# Patient Record
Sex: FEMALE | Race: WHITE | NOT HISPANIC OR LATINO | Employment: UNEMPLOYED | URBAN - METROPOLITAN AREA
[De-identification: names, ages, dates, MRNs, and addresses within clinical notes are randomized per-mention and may not be internally consistent; named-entity substitution may affect disease eponyms.]

---

## 2017-03-17 ENCOUNTER — APPOINTMENT (OUTPATIENT)
Dept: RADIOLOGY | Facility: IMAGING CENTER | Age: 27
End: 2017-03-17
Attending: NURSE PRACTITIONER
Payer: COMMERCIAL

## 2017-03-17 ENCOUNTER — OCCUPATIONAL MEDICINE (OUTPATIENT)
Dept: URGENT CARE | Facility: CLINIC | Age: 27
End: 2017-03-17
Payer: COMMERCIAL

## 2017-03-17 VITALS
RESPIRATION RATE: 16 BRPM | OXYGEN SATURATION: 98 % | TEMPERATURE: 98.2 F | HEIGHT: 65 IN | BODY MASS INDEX: 21.66 KG/M2 | DIASTOLIC BLOOD PRESSURE: 64 MMHG | WEIGHT: 130 LBS | SYSTOLIC BLOOD PRESSURE: 100 MMHG | HEART RATE: 72 BPM

## 2017-03-17 DIAGNOSIS — M77.8 TENDINITIS OF LEFT WRIST: ICD-10-CM

## 2017-03-17 DIAGNOSIS — S69.92XA INJURY OF LEFT WRIST, INITIAL ENCOUNTER: ICD-10-CM

## 2017-03-17 PROCEDURE — 99204 OFFICE O/P NEW MOD 45 MIN: CPT | Performed by: NURSE PRACTITIONER

## 2017-03-17 PROCEDURE — 73110 X-RAY EXAM OF WRIST: CPT | Mod: 26,LT | Performed by: NURSE PRACTITIONER

## 2017-03-17 ASSESSMENT — ENCOUNTER SYMPTOMS
BACK PAIN: 0
NECK PAIN: 0
FALLS: 1

## 2017-03-17 NOTE — MR AVS SNAPSHOT
"        Mita Jenkins   3/17/2017 11:30 AM   Occupational Medicine   MRN: [id number]    Department:  [facility]   Dept Phone:  [phone]    Description:  Female : 1990   Provider:  JONO Alexander           Reason for Visit     Wrist Injury left wrist injury x 2 couple weeks wrist pops and pain radiates      Allergies as of 3/17/2017     Allergen Noted Reactions    Codeine 2016   Vomiting      You were diagnosed with     Injury of left wrist, initial encounter   [757795]       Tendinitis of left wrist   [509763]         Vital Signs     Blood Pressure Pulse Temperature Respirations Height Weight    100/64 mmHg 72 36.8 °C (98.2 °F) 16 1.651 m (5' 5\") 58.968 kg (130 lb)    Body Mass Index Oxygen Saturation Last Menstrual Period Breastfeeding?          21.63 kg/m2 98% 2017 No        Basic Information     Date Of Birth Sex Race Ethnicity Preferred Language    1990 Female White Non- English      Your appointments     Mar 24, 2017  8:00 AM   Workers Compensation with [facility] ([facility])    [address] [place] [address] [address]  [place] [id number] [phone]              Problem List              ICD-10-CM Priority Class Noted - Resolved    Syncope and collapse R55   2016 - Present      Health Maintenance        Date Due Completion Dates    IMM DTaP/Tdap/Td Vaccine (1 - Tdap) 2007 ---    PAP SMEAR 2009 ---    IMM INFLUENZA (1) 2016 ---            Current Immunizations     No immunizations on file.      Below and/or attached are the medications your provider expects you to take. Review all of your home medications and newly ordered medications with your provider and/or pharmacist. Follow medication instructions as directed by your provider and/or pharmacist. Please keep your medication list with you and share with your provider. Update the information when medications are discontinued, doses are changed, or " new medications (including over-the-counter products) are added; and carry medication information at all times in the event of emergency situations     Allergies:  CODEINE - Vomiting               Medications  Valid as of: March 17, 2017 -  1:04 PM    Generic Name Brand Name Tablet Size Instructions for use    Cyclobenzaprine HCl (Tab) FLEXERIL 10 MG Take 1 Tab by mouth 3 times a day as needed for Muscle Spasms.        Ibuprofen (Tab) MOTRIN 600 MG Take 1 Tab by mouth every 6 hours as needed.        MethylPREDNISolone (Kit) MEDROL DOSEPACK 4 MG On day one take 8 mg PO before breakfast and HS, and 4 mg PO after lunch and after dinner. On day two take 4 mg PO 3 times a day and 8 mg PO at bedtime. On day 3 take 4 mg PO 4 times a day. On day 4 take 4 mg PO 3 times a day. On day 5 take 4 mg PO twice a day.On day 6 take 4 mg PO daily        .                 Medicines prescribed today were sent to:     [facility] [address] [place] [address] [place] AT [facility] [employer] [place] [address] [place] [address]    Phone: [phone] Fax: [phone]    Open 24 Hours?: No      Medication refill instructions:       If your prescription bottle indicates you have medication refills left, it is not necessary to call your provider’s office. Please contact your pharmacy and they will refill your medication.    If your prescription bottle indicates you do not have any refills left, you may request refills at any time through one of the following ways: The online [employer] system (except Urgent Care), by calling your provider’s office, or by asking your pharmacy to contact your provider’s office with a refill request. Medication refills are processed only during regular business hours and may not be available until the next business day. Your provider may request additional information or to have a follow-up visit with you prior to refilling your medication.   *Please Note: Medication refills are assigned a new Rx  number when refilled electronically. Your pharmacy may indicate that no refills were authorized even though a new prescription for the same medication is available at the pharmacy. Please request the medicine by name with the pharmacy before contacting your provider for a refill.        Your To Do List     Future Labs/Procedures Complete By Expires    DX-WRIST-COMPLETE 3+ LEFT  As directed 3/17/2018         [employer] Access Code: [email or web address]  Expires: 3/23/2017  1:11 PM    [employer]  A secure, online tool to manage your health information     [employer]’s [employer]® is a secure, online tool that connects you to your personalized health information from the privacy of your home -- day or night - making it very easy for you to manage your healthcare. Once the activation process is completed, you can even access your medical information using the [employer] vera, which is available for free in the Apple Vera store or Google Play store.     [employer] provides the following levels of access (as shown below):   My Chart Features   [facility] Doctor [facility]  Specialists [facility]  Urgent  Care [facility]  Primary Care  Doctor   Email your healthcare team securely and privately 24/7 X X X    Manage appointments: schedule your next appointment; view details of past/upcoming appointments X      Request prescription refills. X      View recent personal medical records, including lab and immunizations X X X X   View health record, including health history, allergies, medications X X X X   Read reports about your outpatient visits, procedures, consult and ER notes X X X X   See your discharge summary, which is a recap of your hospital and/or ER visit that includes your diagnosis, lab results, and care plan. X X       How to register for [employer]:  1. Go to  [email or web address].  2. Click on the Sign Up Now box, which takes you to the New Member Sign Up page. You will need to provide the following information:  a. Enter  your [employer] Access Code exactly as it appears at the top of this page. (You will not need to use this code after you’ve completed the sign-up process. If you do not sign up before the expiration date, you must request a new code.)   b. Enter your date of birth.   c. Enter your home email address.   d. Click Submit, and follow the next screen’s instructions.  3. Create a M[employer] Cortat ID. This will be your [employer] login ID and cannot be changed, so think of one that is secure and easy to remember.  4. Create a [employer] password. You can change your password at any time.  5. Enter your Password Reset Question and Answer. This can be used at a later time if you forget your password.   6. Enter your e-mail address. This allows you to receive e-mail notifications when new information is available in [employer].  7. Click Sign Up. You can now view your health information.    For assistance activating your [employer] account, call [phone]

## 2017-03-17 NOTE — Clinical Note
[facility]   [address] - [place] [address]  Phone: [phone] - Fax: [phone]        [facility] Progress Report and Disability Certification  Date of Service: 3/17/2017   No Show:  No  Date / Time of Next Visit: 3/24/2017   Claim Information   Patient Name: Mita Jenkins  Claim Number:     Employer:   [employer] intermediate Date of Injury: 2/27/2017     Insurer / TPA: [employer]  ID / SSN:     Occupation:   Diagnosis: Diagnoses of Injury of left wrist, initial encounter and Tendinitis of left wrist were pertinent to this visit.    Medical Information   Related to Industrial Injury? Yes    Subjective Complaints:  DOI: 2/27/17- Slipped on ice while walking outside at work, broke her fall with her left hand. She reports immediate pain and moderate swelling after the injury and did seek medical attention from nurse on-site at [facility]. Two weeks after the initial injury, she is still having significant pain, decreased ROM, decreased strength and it is difficult to perform her daily work duties. Ibuprofen and ice are helping minimally.   Objective Findings: Musculoskeletal:        Left wrist: She exhibits decreased range of motion, tenderness, bony tenderness and swelling. She exhibits no effusion, no crepitus and no deformity. TTP, mild BERNICE, no ecchymosis, pain with rotation and extension, decreased  strength. CMS intact. Brisk cap refill.   Pre-Existing Condition(s):     Assessment:   Initial Visit    Status: Additional Care Required  Permanent Disability:No    Plan:      Diagnostics: X-ray  Comments:Unremarkable LEFT wrist.    Comments:  Left wrist thumb spica   Ibuprofen 600mg q6h PRN pain  RICE  Follow up in one week    Disability Information   Status: Released to Restricted Duty    From:  3/17/2017  Through: 3/24/2017 Restrictions are: Temporary   Physical Restrictions   Sitting:     Standing:    Stooping:    Bending:      Squatting:    Walking:    Climbing:    Pushing:      Pulling:    Other:    Reaching Above Shoulder (L):   Reaching Above Shoulder (R):       Reaching Below Shoulder (L):    Reaching Below Shoulder (R):      Not to exceed Weight Limits   Carrying(hrs):   Weight Limit(lb): < or = to 25 pounds Lifting(hrs):   Weight  Limit(lb):     Comments: No physical defense/contact with inmates      Repetitive Actions   Hands: i.e. Fine Manipulations from Grasping:     Feet: i.e. Operating Foot Controls:     Driving / Operate Machinery:     Physician Name: JONO Alexander Physician Signature: JOSEFINA Martínez e-Signature: Dr. Chan Hampton, Medical Director   Clinic Name / Location: [address] [place] [address] [place] [address] Clinic Phone Number: Dept: [phone]   Appointment Time: 11:30 Am Visit Start Time: 11:51 AM   Check-In Time:  11:28 Am Visit Discharge Time:  12:54 PM   Original-Treating Physician or Chiropractor    Page 2-Insurer/TPA    Page 3-Employer    Page 4-Employee

## 2017-03-17 NOTE — PROGRESS NOTES
"Subjective:      Mita Jenkins is a 26 y.o. female who presents with Wrist Injury      DOI: 2/27/17- Slipped on ice while walking outside at work, broke her fall with her left hand. She reports immediate pain and moderate swelling after the injury but did not think her wrist required medical attention. Two weeks after the initial injury, she is still having significant pain, decreased ROM, decreased strength and it is difficult to perform her daily work duties. Ibuprofen and ice are helping minimally.     Wrist Injury         Review of Systems   Musculoskeletal: Positive for joint pain and falls. Negative for back pain and neck pain.   All other systems reviewed and are negative.    PMH:  has no past medical history on file.  MEDS:   Current outpatient prescriptions:   •  ibuprofen (MOTRIN) 600 MG Tab, Take 1 Tab by mouth every 6 hours as needed., Disp: 30 Tab, Rfl: 0  •  methylPREDNISolone (MEDROL, BREANN,) 4 MG tablet, On day one take 8 mg PO before breakfast and HS, and 4 mg PO after lunch and after dinner. On day two take 4 mg PO 3 times a day and 8 mg PO at bedtime. On day 3 take 4 mg PO 4 times a day. On day 4 take 4 mg PO 3 times a day. On day 5 take 4 mg PO twice a day.On day 6 take 4 mg PO daily, Disp: 1 Kit, Rfl: 0  •  cyclobenzaprine (FLEXERIL) 10 MG Tab, Take 1 Tab by mouth 3 times a day as needed for Muscle Spasms., Disp: 21 Tab, Rfl: 0  ALLERGIES:   Allergies   Allergen Reactions   • Codeine Vomiting     SURGHX: No past surgical history on file.  SOCHX:    FH: In accordance with JEM Act of 2008, family history is not collected for Occupational Health visits.         Objective:     /64 mmHg  Pulse 72  Temp(Src) 36.8 °C (98.2 °F)  Resp 16  Ht 1.651 m (5' 5\")  Wt 58.968 kg (130 lb)  BMI 21.63 kg/m2  SpO2 98%  LMP 03/01/2017  Breastfeeding? No     Physical Exam   Constitutional: She is oriented to person, place, and time. Vital signs are normal. She appears well-developed and well-nourished. "   HENT:   Head: Normocephalic.   Eyes: EOM are normal. Pupils are equal, round, and reactive to light.   Neck: Normal range of motion.   Cardiovascular: Normal rate and regular rhythm.    Pulmonary/Chest: Effort normal.   Musculoskeletal:        Left wrist: She exhibits decreased range of motion, tenderness, bony tenderness and swelling. She exhibits no effusion, no crepitus and no deformity.        Arms:  Neurological: She is alert and oriented to person, place, and time. No cranial nerve deficit or sensory deficit.   Skin: Skin is warm and dry.   Psychiatric: She has a normal mood and affect. Her speech is normal and behavior is normal. Thought content normal.   Vitals reviewed.           Xray: no fracture or dislocation by my read. Radiology review pending.     3/17/2017 12:06 PM    HISTORY/REASON FOR EXAM:  Pain/Deformity Following Trauma.  Fall 2 weeks ago, LEFT wrist pain    TECHNIQUE/EXAM DESCRIPTION AND NUMBER OF VIEWS:  4 views of the LEFT wrist.    COMPARISON: None    FINDINGS:  No focal soft tissue swelling.  Carpal alignment is preserved.  No fracture or dislocation.         Impression        Unremarkable LEFT wrist.       Assessment/Plan:     1. Injury of left wrist, initial encounter  - DX-WRIST-COMPLETE 3+ LEFT; Future    Left wrist thumb spica    Ibuprofen 600mg q6h PRN pain  RICE  Follow up in one week

## 2017-03-17 NOTE — Clinical Note
"EMPLOYEE’S CLAIM FOR COMPENSATION/ REPORT OF INITIAL TREATMENT  FORM C-4    EMPLOYEE’S CLAIM - PROVIDE ALL INFORMATION REQUESTED   First Name  Mita Last Name  Gregory Birthdate                    1990                Sex  female Claim Number   Home Address  [address] Age  26 y.o. Height  1.651 m (5' 5\") Weight  58.968 kg (130 lb) [facility]     [facility] Zip  [address] Telephone  [phone] (home)    Mailing Address  [address] [facility] Zip  [address] Primary Language Spoken  English    Insurer  State Compensation Ins Fund Third Party   State Compensation Ins Fund   Employee's Occupation (Job Title) When Injury or Occupational Disease Occurred      Employer's Name    [employer] Telephone   [phone]   Employer Address   [phone] [facility] Zip   [address]   Date of Injury  2/27/2017               Hour of Injury  2:30 AM Date Employer Notified  2/27/2017 Last Day of Work after Injury or Occupational Disease  3/16/2017 Supervisor to Whom Injury Reported  S[employer] Estrada   Address or Location of Accident (if applicable)  [[address]]   What were you doing at the time of accident? (if applicable)  Coming out of [address]    How did this injury or occupational disease occur? (Be specific an answer in detail. Use additional sheet if necessary)  Slipped on ice and landed on wrist   If you believe that you have an occupational disease, when did you first have knowledge of the disability and it relationship to your employment?  N/A Witnesses to the Accident  None      Nature of Injury or Occupational Disease  Sprain  Part(s) of Body Injured or Affected  Wrist (L) and Hand (L), N/A, N/A    I certify that the above is true and correct to the best of my knowledge and that I have provided this " information in order to obtain the benefits of [place]  I hereby authorize any physician, chiropractor, surgeon, practitioner, or other person, any hospital, including [facility], any medical service organization, any insurance company, or other institution or organization to release to each other, any medical or other information, including benefits paid or payable, pertinent to this injury or disease, except information relative to diagnosis, treatment and/or counseling for AIDS, psychological conditions, alcohol or controlled substances, for which I must give specific authorization.  A Photostat of this authorization shall be as valid as the original.     Date   Place   Employee’s Signature   THIS REPORT MUST BE COMPLETED AND MAILED WITHIN 3 WORKING DAYS OF TREATMENT   Place  [facility]  Name of Facility  [facility]   Date  3/17/2017 Diagnosis  (S69.92XA) Injury of left wrist, initial encounter  (M77.8) Tendinitis of left wrist Is there evidence the injured employee was under the influence of alcohol and/or another controlled substance at the time of accident?   Hour  11:51 AM Description of Injury or Disease  Diagnoses of Injury of left wrist, initial encounter and Tendinitis of left wrist were pertinent to this visit. No   Treatment  Left wrist thumb spica  Ibuprofen 600mg q6h PRN pain  RICE  Follow up in one week  Have you advised the patient to remain off work five days or more? No   X-Ray Findings  Negative   If Yes   From Date  To Date      From information given by the employee, together with medical evidence, can you directly connect this injury or occupational disease as job incurred?  Yes If No Full Duty  No Modified Duty  Yes   Is additional medical care by a physician indicated?  Yes If Modified Duty, Specify any Limitations / Restrictions  No  "physical contact with inmates   Do you know of any previous injury or disease contributing to this condition or occupational disease?                            No   Date  3/17/2017 Print Doctor’s Name JONO Alexander I certify the employer’s copy of  this form was mailed on:   Address  [address] Insurer’s Use Only     [facility] Zip  [address]    Provider’s Tax ID Number  [id number]  Telephone  Dept: [phone]        e-SignWHJOSEFINA STOCK   e-Signature: Dr. Chan Hampton, Medical Director Degree  APRN        ORIGINAL-TREATING PHYSICIAN OR CHIROPRACTOR    PAGE 2-INSURER/TPA    PAGE 3-EMPLOYER    PAGE 4-EMPLOYEE             Form C-4 (rev10/07)              BRIEF DESCRIPTION OF RIGHTS AND BENEFITS  (Pursuant to [place]RS 616C.050)    Notice of Injury or Occupational Disease (Incident Report Form C-1): If an injury or occupational disease (OD) arises out of and in the  course of employment, you must provide written notice to your employer as soon as practicable, but no later than 7 days after the accident or  OD. Your employer shall maintain a sufficient supply of the required forms.    Claim for Compensation (Form C-4): If medical treatment is sought, the form C-4 is available at the place of initial treatment. A completed  \"Claim for Compensation\" (Form C-4) must be filed within 90 days after an accident or OD. The treating physician or chiropractor must,  within 3 working days after treatment, complete and mail to the employer, the employer's insurer and third-party , the Claim for  Compensation.    Medical Treatment: If you require medical treatment for your on-the-job injury or OD, you may be required to select a physician or  chiropractor from a list provided by your workers’ compensation insurer, if it has contracted with an Organization for Managed Care (MCO) or  Preferred Provider Organization (PPO) or providers of health care. If your " employer has not entered into a contract with an MCO or PPO, you  may select a physician or chiropractor from the Panel of Physicians and Chiropractors. Any medical costs related to your industrial injury or  OD will be paid by your insurer.    Temporary Total Disability (TTD): If your doctor has certified that you are unable to work for a period of at least 5 consecutive days, or 5  cumulative days in a 20-day period, or places restrictions on you that your employer does not accommodate, you may be entitled to TTD  compensation.    Temporary Partial Disability (TPD): If the wage you receive upon reemployment is less than the compensation for TTD to which you are  entitled, the insurer may be required to pay you TPD compensation to make up the difference. TPD can only be paid for a maximum of 24  months.    Permanent Partial Disability (PPD): When your medical condition is stable and there is an indication of a PPD as a result of your injury or  OD, within 30 days, your insurer must arrange for an evaluation by a rating physician or chiropractor to determine the degree of your PPD. The  amount of your PPD award depends on the date of injury, the results of the PPD evaluation and your age and wage.    Permanent Total Disability (PTD): If you are medically certified by a treating physician or chiropractor as permanently and totally disabled  and have been granted a PTD status by your insurer, you are entitled to receive monthly benefits not to exceed 66 2/3% of your average  monthly wage. The amount of your PTD payments is subject to reduction if you previously received a PPD award.    Vocational Rehabilitation Services: You may be eligible for vocational rehabilitation services if you are unable to return to the job due to a  permanent physical impairment or permanent restrictions as a result of your injury or occupational disease.    Transportation and Per [place] Reimbursement: You may be eligible for travel expenses  and per spring associated with medical treatment.    Reopening: You may be able to reopen your claim if your condition worsens after claim closure.    Appeal Process: If you disagree with a written determination issued by the insurer or the insurer does not respond to your request, you may  appeal to the Department of Administration, , by following the instructions contained in your determination letter. You must  appeal the determination within 70 days from the date of the determination letter at [address], [address], [place], [place]  [address], or [address], [place], [place] [address]. If you disagree with the  decision, you may appeal to the  Department of Administration, . You must file your appeal within 30 days from the date of the  decision  letter at [address], [address], [place], [place] [address], or [address], [place], [place] [address]. If you  disagree with a decision of an , you may file a petition for judicial review with the District Court. You must do so within 30  days of the Appeal Officer’s decision. You may be represented by an  at your own expense or you may contact the [facility] for possible  representation.    [place]  for Injured Workers (NAIW): If you disagree with a  decision, you may request that NAIW represent you  without charge at an  Hearing. For information regarding denial of benefits, you may contact the [facility] at: [address], [place] [address], [phone], or [address], [place] [address], [phone]    To File a Complaint with the Division: If you wish to file a complaint with the  of the Division of Industrial Relations (DIR),  please contact the Workers’ Compensation Section, [address], [address], [place], [place] [address],  telephone [phone], or  [address], [address], [place], [place] [address], telephone [phone].    For assistance with Workers’ Compensation Issues: you may contact the Office of the Governor Consumer Health Assistance, [address] [place] [address], [address], [place], [place] [address], Toll Free 1-638.346.1642, Web site: [email or web address]., E-mail  [email or web address].                                                                                                                                                                                                                                   __________________________________________________________________                                                                   _________________                Employee Name / Signature                                                                                                                                                       Date                                                                                                                                                                                                     D-2 (rev. 10/07)

## 2017-03-24 ENCOUNTER — OFFICE VISIT (OUTPATIENT)
Dept: URGENT CARE | Facility: CLINIC | Age: 27
End: 2017-03-24
Payer: COMMERCIAL

## 2017-03-24 ENCOUNTER — OCCUPATIONAL MEDICINE (OUTPATIENT)
Dept: URGENT CARE | Facility: CLINIC | Age: 27
End: 2017-03-24
Payer: COMMERCIAL

## 2017-03-24 ENCOUNTER — HOSPITAL ENCOUNTER (OUTPATIENT)
Facility: MEDICAL CENTER | Age: 27
End: 2017-03-24
Attending: NURSE PRACTITIONER
Payer: COMMERCIAL

## 2017-03-24 VITALS
BODY MASS INDEX: 21.66 KG/M2 | TEMPERATURE: 98.4 F | SYSTOLIC BLOOD PRESSURE: 110 MMHG | RESPIRATION RATE: 16 BRPM | DIASTOLIC BLOOD PRESSURE: 62 MMHG | OXYGEN SATURATION: 98 % | HEART RATE: 72 BPM | HEIGHT: 65 IN | WEIGHT: 130 LBS | RESPIRATION RATE: 16 BRPM | HEART RATE: 72 BPM | SYSTOLIC BLOOD PRESSURE: 110 MMHG | TEMPERATURE: 98.4 F | DIASTOLIC BLOOD PRESSURE: 62 MMHG | OXYGEN SATURATION: 98 %

## 2017-03-24 DIAGNOSIS — N89.8 ITCHING IN THE VAGINAL AREA: ICD-10-CM

## 2017-03-24 DIAGNOSIS — M77.8 LEFT WRIST TENDINITIS: ICD-10-CM

## 2017-03-24 PROCEDURE — 99213 OFFICE O/P EST LOW 20 MIN: CPT | Performed by: NURSE PRACTITIONER

## 2017-03-24 PROCEDURE — 99213 OFFICE O/P EST LOW 20 MIN: CPT | Mod: 29 | Performed by: NURSE PRACTITIONER

## 2017-03-24 PROCEDURE — 87591 N.GONORRHOEAE DNA AMP PROB: CPT

## 2017-03-24 PROCEDURE — 87491 CHLMYD TRACH DNA AMP PROBE: CPT

## 2017-03-24 RX ORDER — IBUPROFEN 200 MG
200 TABLET ORAL EVERY 6 HOURS PRN
COMMUNITY
End: 2019-01-18

## 2017-03-24 RX ORDER — CITALOPRAM 20 MG/1
20 TABLET ORAL DAILY
COMMUNITY
End: 2019-01-04

## 2017-03-24 RX ORDER — NORGESTIMATE AND ETHINYL ESTRADIOL 7DAYSX3 LO
KIT ORAL
COMMUNITY
End: 2019-01-04

## 2017-03-24 RX ORDER — FLUCONAZOLE 150 MG/1
150 TABLET ORAL DAILY
Qty: 1 TAB | Refills: 1 | Status: SHIPPED | OUTPATIENT
Start: 2017-03-24 | End: 2019-01-04

## 2017-03-24 ASSESSMENT — ENCOUNTER SYMPTOMS
HEADACHES: 0
DIARRHEA: 0
NAUSEA: 0
COUGH: 0
MYALGIAS: 0
PALPITATIONS: 0
CHILLS: 0
CHILLS: 0
NAUSEA: 0
SHORTNESS OF BREATH: 0
FEVER: 0
PALPITATIONS: 0
FEVER: 0
HEADACHES: 0
DIZZINESS: 0
DIZZINESS: 0
VOMITING: 0
VOMITING: 0
SHORTNESS OF BREATH: 0
DIARRHEA: 0
SORE THROAT: 0
MYALGIAS: 0

## 2017-03-24 NOTE — PROGRESS NOTES
"Subjective:      Mita Jenkins is a 26 y.o. female who presents with Exposure to STD    Chief Complaint   Patient presents with   • Exposure to STD     pos std , burning and itching x 4 days     Vaginal itching and burning  Same sexual partner for one year  Was on antibiotics recently for dental infection  No pain on urination  Getting worse  Never had yeast infection before          Exposure to STD  Pertinent negatives include no chest pain, chills, fever, headaches, myalgias, nausea, rash or vomiting.       Review of Systems   Constitutional: Negative for fever, chills and malaise/fatigue.   Respiratory: Negative for shortness of breath.    Cardiovascular: Negative for chest pain and palpitations.   Gastrointestinal: Negative for nausea, vomiting and diarrhea.   Genitourinary: Negative for dysuria.   Musculoskeletal: Negative for myalgias.   Skin: Negative for rash.   Neurological: Negative for dizziness and headaches.   No past medical history on file.  Family history reviewed and not related to this visit  Social History     Social History   • Marital Status: Single     Spouse Name: N/A   • Number of Children: N/A   • Years of Education: N/A     Occupational History   • Not on file.     Social History Main Topics   • Smoking status: Not on file   • Smokeless tobacco: Not on file   • Alcohol Use: Not on file   • Drug Use: Not on file   • Sexual Activity: Not on file     Other Topics Concern   • Not on file     Social History Narrative            Objective:     /62 mmHg  Pulse 72  Temp(Src) 36.9 °C (98.4 °F)  Resp 16  Ht 1.651 m (5' 5\")  Wt 58.968 kg (130 lb)  BMI 21.63 kg/m2  SpO2 98%  LMP 03/01/2017  Breastfeeding? No     Physical Exam   Constitutional: She is oriented to person, place, and time. She appears well-developed and well-nourished. No distress.   HENT:   Head: Normocephalic and atraumatic.   Cardiovascular: Normal rate and regular rhythm.    Genitourinary: Vaginal discharge (thick " patchy, no odor consitent with yeast ) found.   Lymphadenopathy:        Right: No inguinal adenopathy present.        Left: No inguinal adenopathy present.   Neurological: She is alert and oriented to person, place, and time.   Skin: Skin is warm and dry.   Psychiatric: She has a normal mood and affect. Her behavior is normal. Judgment and thought content normal.   Nursing note and vitals reviewed.              Assessment/Plan:     1. Itching in the vaginal area     - fluconazole (DIFLUCAN) 150 MG tablet; Take 1 Tab by mouth every day.  Dispense: 1 Tab; Refill: 1  - CHLAMYDIA/GC PCR URINE OR SWAB; Future    .Ok to give pt results    Please make an appointment for follow up with your primary care provider or make appointment to establish with a primary care. Return for any perception of change in condition, worsening of symptoms, such as perception of worse pain, worsening fever, not getting better, or any other concerns. Please go to the nearest emergency room for any shortness of breath or chest pain or for any of the emergent conditions we have discussed. Patient and/or family states understanding to plan of care, and discharge instructions. Different diagnosis were discussed and signs/symptoms were discussed to educate on.

## 2017-03-24 NOTE — MR AVS SNAPSHOT
Mita LEWumsteg   3/24/2017 8:00 AM   Occupational Medicine   MRN: [id number]    Department:  [facility]   Dept Phone:  [phone]    Description:  Female : 1990   Provider:  CADEN Katz.           Reason for Visit     Follow-Up wc left wrist feeling better      Allergies as of 3/24/2017     Allergen Noted Reactions    Codeine 2016   Vomiting      You were diagnosed with     Left wrist tendinitis   [[id number]]         Vital Signs     Blood Pressure Pulse Temperature Respirations Oxygen Saturation Last Menstrual Period    110/62 mmHg 72 36.9 °C (98.4 °F) 16 98% 2017    Breastfeeding?                   No           Basic Information     Date Of Birth Sex Race Ethnicity Preferred Language    1990 Female White Non- English      Your appointments     Mar 31, 2017  8:00 AM   Workers Compensation with [facility] URGENT [facility]gent [facility] ([facility])    [address]  [place] [id number] [phone]              Problem List              ICD-10-CM Priority Class Noted - Resolved    Syncope and collapse R55   2016 - Present      Health Maintenance        Date Due Completion Dates    IMM DTaP/Tdap/Td Vaccine (1 - Tdap) 2007 ---    PAP SMEAR 2009 ---    IMM INFLUENZA (1) 2016 ---            Current Immunizations     No immunizations on file.      Below and/or attached are the medications your provider expects you to take. Review all of your home medications and newly ordered medications with your provider and/or pharmacist. Follow medication instructions as directed by your provider and/or pharmacist. Please keep your medication list with you and share with your provider. Update the information when medications are discontinued, doses are changed, or new medications (including over-the-counter products) are added; and carry medication information at all times in the event of emergency situations     Allergies:   CODEINE - Vomiting               Medications  Valid as of: March 24, 2017 -  9:22 AM    Generic Name Brand Name Tablet Size Instructions for use    Citalopram Hydrobromide (Tab) CELEXA 20 MG Take 20 mg by mouth every day.        Cyclobenzaprine HCl (Tab) FLEXERIL 10 MG Take 1 Tab by mouth 3 times a day as needed for Muscle Spasms.        Fluconazole (Tab) DIFLUCAN 150 MG Take 1 Tab by mouth every day.        Ibuprofen (Tab) MOTRIN 600 MG Take 1 Tab by mouth every 6 hours as needed.        Ibuprofen (Tab) MOTRIN 200 MG Take 200 mg by mouth every 6 hours as needed.        MethylPREDNISolone (Kit) MEDROL DOSEPACK 4 MG On day one take 8 mg PO before breakfast and HS, and 4 mg PO after lunch and after dinner. On day two take 4 mg PO 3 times a day and 8 mg PO at bedtime. On day 3 take 4 mg PO 4 times a day. On day 4 take 4 mg PO 3 times a day. On day 5 take 4 mg PO twice a day.On day 6 take 4 mg PO daily        Norgestim-Eth Estrad Triphasic (Tab) Norgestim-Eth Estrad Triphasic 0.18/0.215/0.25 MG-25 MCG Take  by mouth.        .                 Medicines prescribed today [facility] to:     [facility] [address] [place] [address] [address] AT [facility] [address] [address] [place] [address]    Phone: [phone] Fax: [phone]    Open 24 Hours?: No      Medication refill instructions:       If your prescription bottle indicates you have medication refills left, it is not necessary to call your provider’s office. Please contact your pharmacy and they will refill your medication.    If your prescription bottle indicates you do not have any refills left, you may request refills at any time through one of the following ways: The online Socialtyze system (except Urgent Care), by calling your provider’s office, or by asking your pharmacy to contact your provider’s office with a refill request. Medication refills are processed only during regular business hours and may not be available until the next  business day. Your provider may request additional information or to have a follow-up visit with you prior to refilling your medication.   *Please Note: Medication refills are assigned a new Rx number when refilled electronically. Your pharmacy may indicate that no refills were authorized even though a new prescription for the same medication is available at the pharmacy. Please request the medicine by name with the pharmacy before contacting your provider for a refill.           nexTune Access Code: [email or web address]  Expires: 4/23/2017  9:22 AM    nexTune  A secure, online tool to manage your health information     [employer]’s nexTune® is a secure, online tool that connects you to your personalized health information from the privacy of your home -- day or night - making it very easy for you to manage your healthcare. Once the activation process is completed, you can even access your medical information using the nexTune vera, which is available for free in the Apple Vera store or Google Play store.     nexTune provides the following levels of access (as shown below):   My Chart Features   [facility] Doctor [facility]  Specialists [facility]  Urgent  Care Non-[facility]  Primary Care  Doctor   Email your healthcare team securely and privately 24/7 X X X    Manage appointments: schedule your next appointment; view details of past/upcoming appointments X      Request prescription refills. X      View recent personal medical records, including lab and immunizations X X X X   View health record, including health history, allergies, medications X X X X   Read reports about your outpatient visits, procedures, consult and ER notes X X X X   See your discharge summary, which is a recap of your hospital and/or ER visit that includes your diagnosis, lab results, and care plan. X X       How to register for nexTune:  1. Go to  [email or web address].  2. Click on the Sign Up Now box, which takes you to the New Member Sign  Up page. You will need to provide the following information:  a. Enter your N[employer] Access Code exactly as it appears at the top of this page. (You will not need to use this code after you’ve completed the sign-up process. If you do not sign up before the expiration date, you must request a new code.)   b. Enter your date of birth.   c. Enter your home email address.   d. Click Submit, and follow the next screen’s instructions.  3. Create a Nvigen ID. This will be your Nvigen login ID and cannot be changed, so think of one that is secure and easy to remember.  4. Create a Nvigen password. You can change your password at any time.  5. Enter your Password Reset Question and Answer. This can be used at a later time if you forget your password.   6. Enter your e-mail address. This allows you to receive e-mail notifications when new information is available in Nvigen.  7. Click Sign Up. You can now view your health information.    For assistance activating your Nvigen account, call [phone]

## 2017-03-24 NOTE — MR AVS SNAPSHOT
"        Mita Donovanron   3/24/2017 8:30 AM   Office Visit   MRN: [id number]    Department:  [facility]   Dept Phone:  [phone]    Description:  Female : 1990   Provider:  CADEN Katz.           Reason for Visit     Exposure to STD pos std , burning and itching x 4 days      Allergies as of 3/24/2017     Allergen Noted Reactions    Codeine 2016   Vomiting      You were diagnosed with     Itching in the vaginal area   [147640]         Vital Signs     Blood Pressure Pulse Temperature Respirations Height Weight    110/62 mmHg 72 36.9 °C (98.4 °F) 16 1.651 m (5' 5\") 58.968 kg (130 lb)    Body Mass Index Oxygen Saturation Last Menstrual Period Breastfeeding?          21.63 kg/m2 98% 2017 No        Basic Information     Date Of Birth Sex Race Ethnicity Preferred Language    1990 Female White Non- English      Your appointments     Mar 31, 2017  8:00 AM   Workers Compensation with [facility]    [address] [place] [address] [address]  [place] [address]   [phone]              Problem List              ICD-10-CM Priority Class Noted - Resolved    Syncope and collapse R55   2016 - Present      Health Maintenance        Date Due Completion Dates    IMM DTaP/Tdap/Td Vaccine (1 - Tdap) 2007 ---    PAP SMEAR 2009 ---    IMM INFLUENZA (1) 2016 ---            Current Immunizations     No immunizations on file.      Below and/or attached are the medications your provider expects you to take. Review all of your home medications and newly ordered medications with your provider and/or pharmacist. Follow medication instructions as directed by your provider and/or pharmacist. Please keep your medication list with you and share with your provider. Update the information when medications are discontinued, doses are changed, or new medications (including over-the-counter products) are added; and carry medication " information at all times in the event of emergency situations     Allergies:  CODEINE - Vomiting               Medications  Valid as of: March 24, 2017 -  9:24 AM    Generic Name Brand Name Tablet Size Instructions for use    Citalopram Hydrobromide (Tab) CELEXA 20 MG Take 20 mg by mouth every day.        Cyclobenzaprine HCl (Tab) FLEXERIL 10 MG Take 1 Tab by mouth 3 times a day as needed for Muscle Spasms.        Fluconazole (Tab) DIFLUCAN 150 MG Take 1 Tab by mouth every day.        Ibuprofen (Tab) MOTRIN 600 MG Take 1 Tab by mouth every 6 hours as needed.        Ibuprofen (Tab) MOTRIN 200 MG Take 200 mg by mouth every 6 hours as needed.        MethylPREDNISolone (Kit) MEDROL DOSEPACK 4 MG On day one take 8 mg PO before breakfast and HS, and 4 mg PO after lunch and after dinner. On day two take 4 mg PO 3 times a day and 8 mg PO at bedtime. On day 3 take 4 mg PO 4 times a day. On day 4 take 4 mg PO 3 times a day. On day 5 take 4 mg PO twice a day.On day 6 take 4 mg PO daily        Norgestim-Eth Estrad Triphasic (Tab) Norgestim-Eth Estrad Triphasic 0.18/0.215/0.25 MG-25 MCG Take  by mouth.        .                 Medicines prescribed today were sent to:     [facility] [address] [place], [place] [address] - [address] AT [place] [address] [place] [address]    Phone: [phone] Fax: [phone]    Open 24 Hours?: No      Medication refill instructions:       If your prescription bottle indicates you have medication refills left, it is not necessary to call your provider’s office. Please contact your pharmacy and they will refill your medication.    If your prescription bottle indicates you do not have any refills left, you may request refills at any time through one of the following ways: The online coUrbanize system (except Urgent Care), by calling your provider’s office, or by asking your pharmacy to contact your provider’s office with a refill request. Medication refills are processed only  during regular business hours and may not be available until the next business day. Your provider may request additional information or to have a follow-up visit with you prior to refilling your medication.   *Please Note: Medication refills are assigned a new Rx number when refilled electronically. Your pharmacy may indicate that no refills were authorized even though a new prescription for the same medication is available at the pharmacy. Please request the medicine by name with the pharmacy before contacting your provider for a refill.        Your To Do List     Future Labs/Procedures Complete By Expires    CHLAMYDIA/GC PCR URINE OR SWAB  As directed 3/24/2018         Rover Apps Access Code: [email or web address]  Expires: 4/23/2017  9:22 AM    Rover Apps  A secure, online tool to manage your health information     [employer]’s Rover Apps® is a secure, online tool that connects you to your personalized health information from the privacy of your home -- day or night - making it very easy for you to manage your healthcare. Once the activation process is completed, you can even access your medical information using the Rover Apps vera, which is available for free in the Apple Vera store or Google Play store.     Rover Apps provides the following levels of access (as shown below):   My Chart Features   [facility] Doctor [facility]  Urgent  Care [facility]  Primary Care  Doctor   Email your healthcare team securely and privately 24/7 X X X    Manage appointments: schedule your next appointment; view details of past/upcoming appointments X      Request prescription refills. X      View recent personal medical records, including lab and immunizations X X X X   View health record, including health history, allergies, medications X X X X   Read reports about your outpatient visits, procedures, consult and ER notes X X X X   See your discharge summary, which is a recap of your hospital and/or ER visit that includes  your diagnosis, lab results, and care plan. X X       How to register for Helpstream:  1. Go to  [email or web address].  2. Click on the Sign Up Now box, which takes you to the New Member Sign Up page. You will need to provide the following information:  a. Enter your JRKICKZt Access Code exactly as it appears at the top of this page. (You will not need to use this code after you’ve completed the sign-up process. If you do not sign up before the expiration date, you must request a new code.)   b. Enter your date of birth.   c. Enter your home email address.   d. Click Submit, and follow the next screen’s instructions.  3. Create a JRKICKZt ID. This will be your JRKICKZt login ID and cannot be changed, so think of one that is secure and easy to remember.  4. Create a JRKICKZt password. You can change your password at any time.  5. Enter your Password Reset Question and Answer. This can be used at a later time if you forget your password.   6. Enter your e-mail address. This allows you to receive e-mail notifications when new information is available in Helpstream.  7. Click Sign Up. You can now view your health information.    For assistance activating your Helpstream account, call [phone]

## 2017-03-24 NOTE — PROGRESS NOTES
"Subjective:      Mita Jenkins is a 26 y.o. female who presents with Follow-Up    Chief Complaint   Patient presents with   • Follow-Up     wc left wrist feeling better         DOI April 27 2017,  Has been wearing thumb spica around the clock and can feel her arm get \"stiff\" at times, does still have a pop sensation with rotation. No pain unless it \"pops\".      HPI    Review of Systems   Constitutional: Negative for fever, chills and malaise/fatigue.   HENT: Negative for congestion and sore throat.    Respiratory: Negative for cough and shortness of breath.    Cardiovascular: Negative for chest pain and palpitations.   Gastrointestinal: Negative for nausea, vomiting and diarrhea.   Genitourinary: Negative for dysuria.   Musculoskeletal: Positive for joint pain. Negative for myalgias.   Skin: Negative for rash.   Neurological: Negative for dizziness and headaches.     History reviewed. No pertinent past medical history.  .     Objective:     /62 mmHg  Pulse 72  Temp(Src) 36.9 °C (98.4 °F)  Resp 16  SpO2 98%  LMP 03/01/2017  Breastfeeding? No     Physical Exam   Constitutional: She is oriented to person, place, and time. She appears well-developed and well-nourished. No distress.   HENT:   Head: Normocephalic and atraumatic.   Pulmonary/Chest: Effort normal.   Musculoskeletal: Normal range of motion. She exhibits tenderness.        Arms:  Neurological: She is alert and oriented to person, place, and time.   Skin: Skin is warm and dry.   Psychiatric: She has a normal mood and affect. Her behavior is normal. Judgment and thought content normal.   Nursing note and vitals reviewed.      Guarded ROM, no reproducible pain but limited full ROM secondary to pain, was able to reproduce pop with phalens test. Pain more on medial side of wrist       Assessment/Plan:     1. Left wrist tendinitis  See D39  Follow up in one week            "

## 2017-03-24 NOTE — Clinical Note
"   [facility] [place]   [address] - [place] [address]  Phone: [phone] - Fax: [phone]        [facility] Progress Report and Disability Certification  Date of Service: 3/24/2017   No Show:  No  Date / Time of Next Visit: 3/31/2017   Claim Information   Patient Name: Mita Jenkins  Claim Number:     Employer:   [employer]  Date of Injury: 2/27/2017     Insurer / TPA: [employer]  ID / SSN:     Occupation:   Diagnosis: The encounter diagnosis was Left wrist tendinitis.    Medical Information   Related to Industrial Injury?      Subjective Complaints:  DOI April 27 2017,  Has been wearing thumb spica around the clock and can feel her arm get \"stiff\" at times, does still have a pop sensation with rotation. No pain unless it \"pops\".    Objective Findings: Guarded ROM, no reproducible pain but limited full ROM secondary to pain, was able to reproduce pop with phalens test. Pain more on medial side of wrist   Pre-Existing Condition(s):     Assessment:   Condition Improved    Status: Additional Care Required  Permanent Disability:No    Plan:      Diagnostics:      Comments:       Disability Information   Status: Released to Restricted Duty    From:  3/24/2017  Through: 3/31/2017 Restrictions are: Temporary   Physical Restrictions   Sitting:    Standing:    Stooping:    Bending:      Squatting:    Walking:    Climbing:    Pushing:      Pulling:    Other:    Reaching Above Shoulder (L):   Reaching Above Shoulder (R):       Reaching Below Shoulder (L):    Reaching Below Shoulder (R):      Not to exceed Weight Limits   Carrying(hrs):   Weight Limit(lb): < or = to 25 pounds Lifting(hrs):   Weight  Limit(lb):     Comments: Continue to use thumb spica, alternated off and on periods t.o day. Soft ROM exercises, motrin.  If pain continues, possible consideration of physical therapy. Limit physical activity with " prisoners.  Recheck in one week    Repetitive Actions   Hands: i.e. Fine Manipulations from Grasping: < or = to 4 hrs/day   Feet: i.e. Operating Foot Controls:     Driving / Operate Machinery:     Physician Name: YOSELIN Katz Physician Signature: LUDMILA Mg e-Signature: Dr. Chan Hampton, Medical Director   Clinic Name / Location: [address]  [place] [address] Clinic Phone Number: Dept: [phone]   Appointment Time: 8:00 Am Visit Start Time: 8:22 AM   Check-In Time:  8:16 Am Visit Discharge Time:  9:21 AM    Original-Treating Physician or Chiropractor    Page 2-Insurer/TPA    Page 3-Employer    Page 4-Employee

## 2017-03-25 LAB
C TRACH DNA GENITAL QL NAA+PROBE: NEGATIVE
N GONORRHOEA DNA GENITAL QL NAA+PROBE: NEGATIVE
SPECIMEN SOURCE: NORMAL

## 2017-03-31 ENCOUNTER — OCCUPATIONAL MEDICINE (OUTPATIENT)
Dept: URGENT CARE | Facility: CLINIC | Age: 27
End: 2017-03-31
Payer: COMMERCIAL

## 2017-03-31 VITALS
TEMPERATURE: 98.9 F | HEART RATE: 56 BPM | OXYGEN SATURATION: 99 % | DIASTOLIC BLOOD PRESSURE: 70 MMHG | RESPIRATION RATE: 16 BRPM | SYSTOLIC BLOOD PRESSURE: 98 MMHG

## 2017-03-31 DIAGNOSIS — M77.8 TENDONITIS OF WRIST, LEFT: Primary | ICD-10-CM

## 2017-03-31 PROCEDURE — 99213 OFFICE O/P EST LOW 20 MIN: CPT | Mod: 29 | Performed by: PHYSICIAN ASSISTANT

## 2017-03-31 NOTE — PATIENT INSTRUCTIONS
RICE for Routine Care of Injuries  The routine care of many injuries includes rest, ice, compression, and elevation (RICE). The RICE strategy is often recommended for injuries to soft tissues, such as a muscle strain, ligament injuries, bruises, and overuse injuries. It can also be used for some bony injuries. Using the RICE strategy can help to relieve pain, lessen swelling, and enable your body to heal.  Rest  Rest is required to allow your body to heal. This usually involves reducing your normal activities and avoiding use of the injured part of your body. Generally, you can return to your normal activities when you are comfortable and have been given permission by your health care provider.  Ice  Icing your injury helps to keep the swelling down, and it lessens pain. Do not apply ice directly to your skin.  · Put ice in a plastic bag.  · Place a towel between your skin and the bag.  · Leave the ice on for 20 minutes, 2-3 times a day.  Do this for as long as you are directed by your health care provider.  Compression  Compression means putting pressure on the injured area. Compression helps to keep swelling down, gives support, and helps with discomfort. Compression may be done with an elastic bandage. If an elastic bandage has been applied, follow these general tips:  · Remove and reapply the bandage every 3-4 hours or as directed by your health care provider.  · Make sure the bandage is not wrapped too tightly, because this can cut off circulation. If part of your body beyond the bandage becomes blue, numb, cold, swollen, or more painful, your bandage is most likely too tight. If this occurs, remove your bandage and reapply it more loosely.  · See your health care provider if the bandage seems to be making your problems worse rather than better.  Elevation  Elevation means keeping the injured area raised. This helps to lessen swelling and decrease pain. If possible, your injured area should be elevated at or  above the level of your heart or the center of your chest.  WHEN SHOULD I SEEK MEDICAL CARE?  You should seek medical care if:  · Your pain and swelling continue.  · Your symptoms are getting worse rather than improving.  These symptoms may indicate that further evaluation or further X-rays are needed. Sometimes, X-rays may not show a small broken bone (fracture) until a number of days later. Make a follow-up appointment with your health care provider.  WHEN SHOULD I SEEK IMMEDIATE MEDICAL CARE?  You should seek immediate medical care if:  · You have sudden severe pain at or below the area of your injury.  · You have redness or increased swelling around your injury.  · You have tingling or numbness at or below the area of your injury that does not improve after you remove the elastic bandage.     This information is not intended to replace advice given to you by your health care provider. Make sure you discuss any questions you have with your health care provider.     Document Released: 04/01/2002 Document Revised: 12/23/2014 Document Reviewed: 11/25/2015  [employer] Interactive Patient Education ©2016 Elsevier Inc.

## 2017-03-31 NOTE — PROGRESS NOTES
Subjective:      PT is a 26 y.o. female who presents with Follow-Up      DOI: 2/27/17-   Pt returns to the  for a WC follow up and states after 33 days her wrist is 100% back to normal. Pt restates she slipped on ice while walking outside at work, broke her fall with her left hand. Pt states pain is currently 0/10. Pt has not taken any Rx medications for this condition. Pt denies second job and notes she is ready for full duty. Pt denies CP, SOB, NVD, paresthesias, headaches, dizziness, change in vision, hives, or other joint pain. The pt's medication list, problem list, and allergies have been evaluated and reviewed during today's visit.       HPI  PMH:  Negative per pt.      PSH:  Negative per pt.      Fam Hx:  Father with hx of HTN    Soc HX:  Social History     Social History   • Marital Status: Single     Spouse Name: N/A   • Number of Children: N/A   • Years of Education: N/A     Occupational History   • Not on file.     Social History Main Topics   • Smoking status: Not on file   • Smokeless tobacco: Not on file   • Alcohol Use: Not on file   • Drug Use: Not on file   • Sexual Activity: Not on file     Other Topics Concern   • Not on file     Social History Narrative         Medications:    Current outpatient prescriptions:   •  ibuprofen (MOTRIN) 200 MG Tab, Take 200 mg by mouth every 6 hours as needed., Disp: , Rfl:   •  Norgestim-Eth Estrad Triphasic 0.18/0.215/0.25 MG-25 MCG Tab, Take  by mouth., Disp: , Rfl:   •  citalopram (CELEXA) 20 MG Tab, Take 20 mg by mouth every day., Disp: , Rfl:   •  cyclobenzaprine (FLEXERIL) 10 MG Tab, Take 1 Tab by mouth 3 times a day as needed for Muscle Spasms., Disp: 21 Tab, Rfl: 0  •  fluconazole (DIFLUCAN) 150 MG tablet, Take 1 Tab by mouth every day., Disp: 1 Tab, Rfl: 1  •  methylPREDNISolone (MEDROL, BREANN,) 4 MG tablet, On day one take 8 mg PO before breakfast and HS, and 4 mg PO after lunch and after dinner. On day two take 4 mg PO 3 times a day and 8 mg PO at  bedtime. On day 3 take 4 mg PO 4 times a day. On day 4 take 4 mg PO 3 times a day. On day 5 take 4 mg PO twice a day.On day 6 take 4 mg PO daily, Disp: 1 Kit, Rfl: 0  •  ibuprofen (MOTRIN) 600 MG Tab, Take 1 Tab by mouth every 6 hours as needed., Disp: 30 Tab, Rfl: 0      Allergies:  Codeine      ROS  Constitutional: Negative for fever, chills and malaise/fatigue.   HENT: Negative for congestion and sore throat.    Eyes: Negative for blurred vision, double vision and photophobia.   Respiratory: Negative for cough and shortness of breath.    Cardiovascular: Negative for chest pain and palpitations.   Gastrointestinal: Negative for heartburn, nausea, vomiting, abdominal pain, diarrhea and constipation.   Genitourinary: Negative for dysuria and flank pain.   Musculoskeletal: POS for left wrist tendonitis.   Skin: Negative for itching and rash.   Neurological: Negative for dizziness, tingling and headaches.   Endo/Heme/Allergies: Does not bruise/bleed easily.   Psychiatric/Behavioral: Negative for depression. The patient is not nervous/anxious.           Objective:     BP 98/70 mmHg  Pulse 56  Temp(Src) 37.2 °C (98.9 °F)  Resp 16  SpO2 99%  LMP 03/01/2017     Physical Exam    Left wrist: Upon inspection, no ecchymoses, no edema, no erythema. NO TTP, +AROM, +SILT, STR 5/5, pulses 2+ B/L        Constitutional: PT is oriented to person, place, and time. PT appears well-developed and well-nourished. No distress.   HENT:   Head: Normocephalic and atraumatic.   Mouth/Throat: Oropharynx is clear and moist. No oropharyngeal exudate.   Eyes: Conjunctivae normal and EOM are normal. Pupils are equal, round, and reactive to light.   Neck: Normal range of motion. Neck supple. No thyromegaly present.   Cardiovascular: Normal rate, regular rhythm, normal heart sounds and intact distal pulses.  Exam reveals no gallop and no friction rub.    No murmur heard.  Pulmonary/Chest: Effort normal and breath sounds normal. No respiratory  distress. PT has no wheezes. PT has no rales. Pt exhibits no tenderness.   Abdominal: Soft. Bowel sounds are normal. PT exhibits no distension and no mass. There is no tenderness. There is no rebound and no guarding.   Neurological: PT is alert and oriented to person, place, and time. PT has normal reflexes. No cranial nerve deficit.   Skin: Skin is warm and dry. No rash noted. PT is not diaphoretic. No erythema.       Psychiatric: PT has a normal mood and affect. PT behavior is normal. Judgment and thought content normal.     Assessment/Plan:     1. Tendonitis of wrist, left    D/C MMI  Rest, fluids encouraged.  AVS with medical info given.  Pt was in full understanding and agreement with the plan.  Follow-up as needed if symptoms worsen or fail to improve.

## 2017-03-31 NOTE — MR AVS SNAPSHOT
Mita Zumsteg   3/31/2017 8:00 AM   Occupational Medicine   MRN: [id number]    Department:  [facility]   Dept Phone:  [phone]    Description:  Female : 1990   Provider:  Jad Luna PA-C           Reason for Visit     Follow-Up workers comp left wrist      Allergies as of 3/31/2017     Allergen Noted Reactions    Codeine 2016   Vomiting      Vital Signs     Blood Pressure Pulse Temperature Respirations Oxygen Saturation Last Menstrual Period    98/70 mmHg 56 37.2 °C (98.9 °F) 16 99% 2017      Basic Information     Date Of Birth Sex Race Ethnicity Preferred Language    1990 Female White Non- English      Problem List              ICD-10-CM Priority Class Noted - Resolved    Syncope and collapse R55   2016 - Present      Health Maintenance        Date Due Completion Dates    IMM DTaP/Tdap/Td Vaccine (1 - Tdap) 2007 ---    PAP SMEAR 2009 ---    IMM INFLUENZA (1) 2016 ---            Current Immunizations     No immunizations on file.      Below and/or attached are the medications your provider expects you to take. Review all of your home medications and newly ordered medications with your provider and/or pharmacist. Follow medication instructions as directed by your provider and/or pharmacist. Please keep your medication list with you and share with your provider. Update the information when medications are discontinued, doses are changed, or new medications (including over-the-counter products) are added; and carry medication information at all times in the event of emergency situations     Allergies:  CODEINE - Vomiting               Medications  Valid as of: 2017 - 10:40 AM    Generic Name Brand Name Tablet Size Instructions for use    Citalopram Hydrobromide (Tab) CELEXA 20 MG Take 20 mg by mouth every day.        Cyclobenzaprine HCl (Tab) FLEXERIL 10 MG Take 1 Tab by mouth 3 times a day as needed for Muscle Spasms.        Fluconazole (Tab) DIFLUCAN 150 MG Take 1 Tab by mouth every day.        Ibuprofen (Tab) MOTRIN 600 MG Take 1 Tab by mouth every 6 hours as needed.        Ibuprofen (Tab) MOTRIN 200 MG Take 200 mg by mouth every 6 hours as needed.        MethylPREDNISolone (Kit) MEDROL DOSEPACK 4 MG On day one take 8 mg PO before breakfast and HS, and 4 mg PO after lunch and after dinner. On day two take 4 mg PO 3 times a day and 8 mg PO at bedtime. On day 3 take 4 mg PO 4 times a day. On day 4 take 4 mg PO 3 times a day. On day 5 take 4 mg PO twice a day.On day 6 take 4 mg PO daily        Norgestim-Eth Estrad Triphasic (Tab) Norgestim-Eth Estrad Triphasic 0.18/0.215/0.25 MG-25 MCG Take  by mouth.        .                 Medicines prescribed today were sent to:     [facility] [address] [place] [address], [place] [address] [facility] [place] [address] [address] [place] [address]    Phone: [phone] Fax: [phone]    Open 24 Hours?: No      Medication refill instructions:       If your prescription bottle indicates you have medication refills left, it is not necessary to call your provider’s office. Please contact your pharmacy and they will refill your medication.    If your prescription bottle indicates you do not have any refills left, you may request refills at any time through one of the following ways: The online Clinicbook system (except Urgent Care), by calling your provider’s office, or by asking your pharmacy to contact your provider’s office with a refill request. Medication refills are processed only during regular business hours and may not be available until the next business day. Your provider may request additional information or to have a follow-up visit with you prior to refilling your medication.   *Please Note: Medication refills are assigned a new Rx number when refilled electronically. Your pharmacy may indicate that no refills were authorized even though a new prescription for the same medication is  available at the pharmacy. Please request the medicine by name with the pharmacy before contacting your provider for a refill.           Oobafit Access Code: [email or web address]  Expires: 4/23/2017  9:22 AM    Oobafit  A secure, online tool to manage your health information     FIRE1s Oobafit® is a secure, online tool that connects you to your personalized health information from the privacy of your home -- day or night - making it very easy for you to manage your healthcare. Once the activation process is completed, you can even access your medical information using the Oobafit vera, which is available for free in the Apple Vera store or Google Play store.     Oobafit provides the following levels of access (as shown below):   My Chart Features   [facility] Doctor [facility]  Urgent  Care Non-[facility]  Primary Care  Doctor   Email your healthcare team securely and privately 24/7 X X X    Manage appointments: schedule your next appointment; view details of past/upcoming appointments X      Request prescription refills. X      View recent personal medical records, including lab and immunizations X X X X   View health record, including health history, allergies, medications X X X X   Read reports about your outpatient visits, procedures, consult and ER notes X X X X   See your discharge summary, which is a recap of your hospital and/or ER visit that includes your diagnosis, lab results, and care plan. X X       How to register for Oobafit:  1. Go to  [email or web address].  2. Click on the Sign Up Now box, which takes you to the New Member Sign Up page. You will need to provide the following information:  a. Enter your Oobafit Access Code exactly as it appears at the top of this page. (You will not need to use this code after you’ve completed the sign-up process. If you do not sign up before the expiration date, you must request a new code.)   b. Enter your date of birth.   c. Enter  your home email address.   d. Click Submit, and follow the next screen’s instructions.  3. Create a Clever Goats Mediat ID. This will be your Lumeta login ID and cannot be changed, so think of one that is secure and easy to remember.  4. Create a Clever Goats Mediat password. You can change your password at any time.  5. Enter your Password Reset Question and Answer. This can be used at a later time if you forget your password.   6. Enter your e-mail address. This allows you to receive e-mail notifications when new information is available in Lumeta.  7. Click Sign Up. You can now view your health information.    For assistance activating your Lumeta account, call [phone]

## 2017-03-31 NOTE — Clinical Note
[facility]   [address] - [place] [address]  Phone: [phone] - Fax: [phone]        [facility] Progress Report and Disability Certification  Date of Service: 3/31/2017   No Show:  No  Date / Time of Next Visit:   MMI   Claim Information   Patient Name: Mita Jenkins  Claim Number:     Employer:   [employer] longterm  Date of Injury: 2/27/2017     Insurer / TPA: [employer]  ID / SSN:     Occupation:   Diagnosis: There were no encounter diagnoses.    Medical Information   Related to Industrial Injury? Yes    Subjective Complaints:  DOI: 2/27/17-   Pt returns to the  for a WC follow up and states after 33 days her wrist is 100% back to normal. Pt restates she slipped on ice while walking outside at work, broke her fall with her left hand. Pt states pain is currently 0/10. Pt has not taken any Rx medications for this condition. Pt denies second job and notes she is ready for full duty. Pt denies CP, SOB, NVD, paresthesias, headaches, dizziness, change in vision, hives, or other joint pain. The pt's medication list, problem list, and allergies have been evaluated and reviewed during today's visit.     Objective Findings: Left wrist: Upon inspection, no ecchymoses, no edema, no erythema. NO TTP, +AROM, +SILT, STR 5/5, pulses 2+ B/L      Pre-Existing Condition(s):     Assessment:   Condition Improved    Status: Discharged /  MMI  Permanent Disability:No    Plan:      Diagnostics:      Comments:       Disability Information   Status: Released to Full Duty    From:     Through:   Restrictions are:     Physical Restrictions   Sitting:    Standing:    Stooping:    Bending:      Squatting:    Walking:    Climbing:    Pushing:      Pulling:    Other:    Reaching Above Shoulder (L):   Reaching Above Shoulder (R):       Reaching Below Shoulder (L):    Reaching Below Shoulder (R):      Not to exceed Weight  Limits   Carrying(hrs):   Weight Limit(lb):   Lifting(hrs):   Weight  Limit(lb):     Comments: PT able to return to FULL DUTY on 4/3/17 with no restrictions.    Repetitive Actions   Hands: i.e. Fine Manipulations from Grasping:     Feet: i.e. Operating Foot Controls:     Driving / Operate Machinery:     Physician Name: Yamil Luna PA-C Physician Signature: YAMIL Guevara PA-C e-Signature: Dr. Chan Hampton, Medical Director   Clinic Name / Location: [address] [place] [address] [place] [address] Clinic Phone Number: Dept: [phone]   Appointment Time: 8:00 Am Visit Start Time: 10:26 AM   Check-In Time:  10:18 Am Visit Discharge Time:  10:39 AM    Original-Treating Physician or Chiropractor    Page 2-Insurer/TPA    Page 3-Employer    Page 4-Employee

## 2017-06-02 ENCOUNTER — HOSPITAL ENCOUNTER (OUTPATIENT)
Dept: RADIOLOGY | Facility: MEDICAL CENTER | Age: 27
End: 2017-06-02
Attending: PHYSICIAN ASSISTANT
Payer: COMMERCIAL

## 2017-06-02 ENCOUNTER — OFFICE VISIT (OUTPATIENT)
Dept: URGENT CARE | Facility: PHYSICIAN GROUP | Age: 27
End: 2017-06-02
Payer: COMMERCIAL

## 2017-06-02 VITALS
DIASTOLIC BLOOD PRESSURE: 70 MMHG | WEIGHT: 127 LBS | HEIGHT: 65 IN | HEART RATE: 99 BPM | SYSTOLIC BLOOD PRESSURE: 110 MMHG | RESPIRATION RATE: 16 BRPM | BODY MASS INDEX: 21.16 KG/M2 | TEMPERATURE: 98.1 F | OXYGEN SATURATION: 97 %

## 2017-06-02 DIAGNOSIS — M54.42 ACUTE BILATERAL LOW BACK PAIN WITH BILATERAL SCIATICA: ICD-10-CM

## 2017-06-02 DIAGNOSIS — M62.838 NECK MUSCLE SPASM: ICD-10-CM

## 2017-06-02 DIAGNOSIS — R10.9 FLANK PAIN: ICD-10-CM

## 2017-06-02 DIAGNOSIS — J10.1 INFLUENZA B: ICD-10-CM

## 2017-06-02 DIAGNOSIS — M54.41 ACUTE BILATERAL LOW BACK PAIN WITH BILATERAL SCIATICA: ICD-10-CM

## 2017-06-02 LAB
APPEARANCE UR: NORMAL
BILIRUB UR STRIP-MCNC: NEGATIVE MG/DL
COLOR UR AUTO: NORMAL
FLUAV+FLUBV AG SPEC QL IA: POSITIVE
GLUCOSE UR STRIP.AUTO-MCNC: NEGATIVE MG/DL
HETEROPH AB SER QL LA: NEGATIVE
INT CON NEG: NEGATIVE
INT CON POS: POSITIVE
KETONES UR STRIP.AUTO-MCNC: NORMAL MG/DL
LEUKOCYTE ESTERASE UR QL STRIP.AUTO: NEGATIVE
NITRITE UR QL STRIP.AUTO: NEGATIVE
PH UR STRIP.AUTO: 5 [PH] (ref 5–8)
POC URINE PREGNANCY TEST: NEGATIVE
PROT UR QL STRIP: NORMAL MG/DL
RBC UR QL AUTO: NORMAL
S PYO AG THROAT QL: NEGATIVE
SP GR UR STRIP.AUTO: 1.02
UROBILINOGEN UR STRIP-MCNC: NEGATIVE MG/DL

## 2017-06-02 PROCEDURE — 87880 STREP A ASSAY W/OPTIC: CPT | Performed by: PHYSICIAN ASSISTANT

## 2017-06-02 PROCEDURE — 86308 HETEROPHILE ANTIBODY SCREEN: CPT | Performed by: PHYSICIAN ASSISTANT

## 2017-06-02 PROCEDURE — 81002 URINALYSIS NONAUTO W/O SCOPE: CPT | Performed by: PHYSICIAN ASSISTANT

## 2017-06-02 PROCEDURE — 74176 CT ABD & PELVIS W/O CONTRAST: CPT

## 2017-06-02 PROCEDURE — 99214 OFFICE O/P EST MOD 30 MIN: CPT | Performed by: PHYSICIAN ASSISTANT

## 2017-06-02 PROCEDURE — 81025 URINE PREGNANCY TEST: CPT | Performed by: PHYSICIAN ASSISTANT

## 2017-06-02 PROCEDURE — 87804 INFLUENZA ASSAY W/OPTIC: CPT | Performed by: PHYSICIAN ASSISTANT

## 2017-06-02 RX ORDER — CYCLOBENZAPRINE HCL 10 MG
10 TABLET ORAL 3 TIMES DAILY PRN
Qty: 30 TAB | Refills: 0 | Status: SHIPPED | OUTPATIENT
Start: 2017-06-02 | End: 2019-01-04

## 2017-06-02 RX ORDER — OSELTAMIVIR PHOSPHATE 75 MG/1
75 CAPSULE ORAL 2 TIMES DAILY
Qty: 10 CAP | Refills: 0 | Status: SHIPPED | OUTPATIENT
Start: 2017-06-02 | End: 2019-01-04

## 2017-06-02 NOTE — MR AVS SNAPSHOT
"        Mita Jenkins   2017 9:05 AM   Office Visit   MRN: [id number]    Department:  [facility]   Dept Phone:  [phone]    Description:  Female : 1990   Provider:  Eloina Silveira PA-C           Reason for Visit     Pharyngitis pain in back and thighs, R. ear pain X 3 days       Allergies as of 2017     Allergen Noted Reactions    Codeine 2016   Vomiting      You were diagnosed with     Sore throat   [208450]       Flank pain   [861890]         Vital Signs     Blood Pressure Pulse Temperature Respirations Height Weight    110/70 mmHg 99 36.7 °C (98.1 °F) 16 1.651 m (5' 5\") 57.607 kg (127 lb)    Body Mass Index Oxygen Saturation                21.13 kg/m2 97%          Basic Information     Date Of Birth Sex Race Ethnicity Preferred Language    1990 Female White Non- English      Your appointments     2017  3:30 PM   CT BODY WO 30 with [facility]   [facility]    [address]  [place] [id number] [phone]           Some exams require specific prep instructions that would have been given to you at time of scheduling. If you have any additional questions about the prep instructions, please call Imaging Scheduling at [phone] and press #2.            2017 12:45 PM   NEW PATIENT with David Read M.D.   [facility] (--)    [address], [address]  [place] [id number] [phone]              Problem List              ICD-10-CM Priority Class Noted - Resolved    Syncope and collapse R55   2016 - Present      Health Maintenance        Date Due Completion Dates    IMM DTaP/Tdap/Td Vaccine (1 - Tdap) 2007 ---    PAP SMEAR 2009 ---            Results     POCT Rapid Strep A      Component    Rapid Strep Screen    Negative    Internal Control Positive    Positive    Internal Control Negative    Negative                POCT Mononucleosis (mono)      Component   " Heterophile Screen    Negative    Internal Control Positive    Positive    Internal Control Negative    Negative                POCT Pregnancy      Component Value Standard Range & Units    POC Urine Pregnancy Test Negative Negative    Internal Control Positive Positive     Internal Control Negative Negative                 POCT Urinalysis      Component Value Standard Range & Units    POC Color Copper Negative    POC Appearance Cloudy Negative    POC Leukocyte Esterase Negative Negative    POC Nitrites Negative Negative    POC Urobiligen Negative Negative (0.2) mg/dL    POC Protein Trace Negative mg/dL    POC Urine PH 5.0 5.0 - 8.0    POC Blood Large Hemolyzed Negative    POC Specific Gravity 1.020 <1.005 - >1.030    POC Ketones Trace Negative mg/dL    POC Biliruben Negative Negative mg/dL    POC Glucose Negative Negative mg/dL                        Current Immunizations     No immunizations on file.      Below and/or attached are the medications your provider expects you to take. Review all of your home medications and newly ordered medications with your provider and/or pharmacist. Follow medication instructions as directed by your provider and/or pharmacist. Please keep your medication list with you and share with your provider. Update the information when medications are discontinued, doses are changed, or new medications (including over-the-counter products) are added; and carry medication information at all times in the event of emergency situations     Allergies:  CODEINE - Vomiting               Medications  Valid as of: June 02, 2017 - 10:06 AM    Generic Name Brand Name Tablet Size Instructions for use    Citalopram Hydrobromide (Tab) CELEXA 20 MG Take 20 mg by mouth every day.        Cyclobenzaprine HCl (Tab) FLEXERIL 10 MG Take 1 Tab by mouth 3 times a day as needed for Muscle Spasms.        Fluconazole (Tab) DIFLUCAN 150 MG Take 1 Tab by mouth every day.        Ibuprofen (Tab) MOTRIN 600 MG Take 1 Tab  by mouth every 6 hours as needed.        Ibuprofen (Tab) MOTRIN 200 MG Take 200 mg by mouth every 6 hours as needed.        MethylPREDNISolone (Kit) MEDROL DOSEPACK 4 MG On day one take 8 mg PO before breakfast and HS, and 4 mg PO after lunch and after dinner. On day two take 4 mg PO 3 times a day and 8 mg PO at bedtime. On day 3 take 4 mg PO 4 times a day. On day 4 take 4 mg PO 3 times a day. On day 5 take 4 mg PO twice a day.On day 6 take 4 mg PO daily        Norgestim-Eth Estrad Triphasic (Tab) Norgestim-Eth Estrad Triphasic 0.18/0.215/0.25 MG-25 MCG Take  by mouth.        Promethazine HCl   Take  by mouth.        .                 Medicines prescribed today were sent to:     [email or web address] [address] [place] [address] - [facility] [address] [facility]    [address] [place] [address]    Phone: [phone] Fax: [phone]    Open 24 Hours?: No      Medication refill instructions:       If your prescription bottle indicates you have medication refills left, it is not necessary to call your provider’s office. Please contact your pharmacy and they will refill your medication.    If your prescription bottle indicates you do not have any refills left, you may request refills at any time through one of the following ways: The online Depop system (except Urgent Care), by calling your provider’s office, or by asking your pharmacy to contact your provider’s office with a refill request. Medication refills are processed only during regular business hours and may not be available until the next business day. Your provider may request additional information or to have a follow-up visit with you prior to refilling your medication.   *Please Note: Medication refills are assigned a new Rx number when refilled electronically. Your pharmacy may indicate that no refills were authorized even though a new prescription for the same medication is available at the pharmacy. Please request the medicine by name with  the pharmacy before contacting your provider for a refill.        Your To Do List     Future Labs/Procedures Complete By Expires    CT-RENAL COLIC EVALUATION(A/P W/O)  As directed 6/2/2018         Zubie Access Code: [email or web address]  Expires: 6/9/2017  1:25 PM    Zubie  A secure, online tool to manage your health information     [employer]’s Zubie® is a secure, online tool that connects you to your personalized health information from the privacy of your home -- day or night - making it very easy for you to manage your healthcare. Once the activation process is completed, you can even access your medical information using the Zubie vera, which is available for free in the Apple Vera store or Google Play store.     Zubie provides the following levels of access (as shown below):   My Chart Features   [facility] Doctor [facility]  Urgent  Care Non-[facility]  Primary Care  Doctor   Email your healthcare team securely and privately 24/7 X X X    Manage appointments: schedule your next appointment; view details of past/upcoming appointments X      Request prescription refills. X      View recent personal medical records, including lab and immunizations X X X X   View health record, including health history, allergies, medications X X X X   Read reports about your outpatient visits, procedures, consult and ER notes X X X X   See your discharge summary, which is a recap of your hospital and/or ER visit that includes your diagnosis, lab results, and care plan. X X       How to register for Zubie:  1. Go to  [email or web address].  2. Click on the Sign Up Now box, which takes you to the New Member Sign Up page. You will need to provide the following information:  a. Enter your Zubie Access Code exactly as it appears at the top of this page. (You will not need to use this code after you’ve completed the sign-up process. If you do not sign up before the expiration date, you must  request a new code.)   b. Enter your date of birth.   c. Enter your home email address.   d. Click Submit, and follow the next screen’s instructions.  3. Create a Supramedt ID. This will be your Solid Sound login ID and cannot be changed, so think of one that is secure and easy to remember.  4. Create a Supramedt password. You can change your password at any time.  5. Enter your Password Reset Question and Answer. This can be used at a later time if you forget your password.   6. Enter your e-mail address. This allows you to receive e-mail notifications when new information is available in Solid Sound.  7. Click Sign Up. You can now view your health information.    For assistance activating your Solid Sound account, call [phone]

## 2017-06-03 ASSESSMENT — ENCOUNTER SYMPTOMS
FLANK PAIN: 1
ABDOMINAL PAIN: 0
NAUSEA: 0
SWOLLEN GLANDS: 1
VOMITING: 0
DIZZINESS: 0
SORE THROAT: 1
HEADACHES: 0
BACK PAIN: 1
SPUTUM PRODUCTION: 0
DIARRHEA: 0
COUGH: 0
NECK PAIN: 1
TROUBLE SWALLOWING: 1
FEVER: 0
SHORTNESS OF BREATH: 0
CHILLS: 0

## 2017-06-03 NOTE — PROGRESS NOTES
"Subjective:      Mita Jenkins is a 27 y.o. female who presents with Pharyngitis          Patient presents to urgent care reporting sore throat, left sided neck pain, left sided low back pain, and bilateral anterior thigh tingling x 1 day. Surgical history includes tonsillectomy, cholecystectomy, and appendectomy. Denies history of kidney stones. Denies dysuria, frequency, urgency, or hematuria. She is not currently pregnant and denies possibility of pregnancy. No history of chronic low back pain. States it feels as if her left side of her neck is tightening and spasming. She takes oral contraceptive and Celexa daily.     Pharyngitis   This is a new problem. The current episode started yesterday. The problem has been unchanged. Neither side of throat is experiencing more pain than the other. Maximum temperature: unmeasured. The fever has been present for less than 1 day. The pain is at a severity of 4/10. The pain is moderate. Associated symptoms include ear pain, neck pain, swollen glands and trouble swallowing. Pertinent negatives include no abdominal pain, coughing, diarrhea, headaches, shortness of breath or vomiting. Associated symptoms comments: Back pain. She has had no exposure to strep or mono. She has tried nothing for the symptoms.       Review of Systems   Constitutional: Negative for fever and chills.   HENT: Positive for ear pain, sore throat and trouble swallowing.    Respiratory: Negative for cough, sputum production and shortness of breath.    Cardiovascular: Negative for chest pain.   Gastrointestinal: Negative for nausea, vomiting, abdominal pain and diarrhea.   Genitourinary: Positive for flank pain. Negative for dysuria, urgency, frequency and hematuria.   Musculoskeletal: Positive for back pain and neck pain.   Neurological: Negative for dizziness and headaches.          Objective:     /70 mmHg  Pulse 99  Temp(Src) 36.7 °C (98.1 °F)  Resp 16  Ht 1.651 m (5' 5\")  Wt 57.607 kg (127 " lb)  BMI 21.13 kg/m2  SpO2 97%     Physical Exam   Constitutional: She is oriented to person, place, and time. She appears well-developed and well-nourished. No distress.   HENT:   Head: Normocephalic and atraumatic.   Right Ear: Hearing, tympanic membrane, external ear and ear canal normal.   Left Ear: Hearing, tympanic membrane, external ear and ear canal normal.   Nose: Nose normal.   Mouth/Throat: Posterior oropharyngeal erythema present. No oropharyngeal exudate.   Posterior oropharyngeal erythema without exudate noted. Tonsils absent.    Eyes: Conjunctivae are normal. Pupils are equal, round, and reactive to light. Right eye exhibits no discharge. Left eye exhibits no discharge.   Neck: Muscular tenderness present. No spinous process tenderness present. Decreased range of motion present.       Cardiovascular: Normal rate, regular rhythm and normal heart sounds.    No murmur heard.  Pulmonary/Chest: Effort normal and breath sounds normal. No respiratory distress. She has no wheezes. She has no rales.   Musculoskeletal:        Lumbar back: She exhibits decreased range of motion, tenderness, pain and spasm. She exhibits no bony tenderness.        Back:    Lymphadenopathy:     She has cervical adenopathy.   Neurological: She is alert and oriented to person, place, and time.   Skin: Skin is warm and dry. She is not diaphoretic.   Psychiatric: She has a normal mood and affect. Her behavior is normal.   Nursing note and vitals reviewed.         POCT Urinalysis:   Component Results      Component Value Ref Range & Units Status     POC Color Copper Negative Final     POC Appearance Cloudy Negative Final     POC Leukocyte Esterase Negative Negative Final     POC Nitrites Negative Negative Final     POC Urobiligen Negative Negative (0.2) mg/dL Final     POC Protein Trace Negative mg/dL Final     POC Urine PH 5.0 5.0 - 8.0 Final     POC Blood Large Hemolyzed Negative Final     POC Specific Gravity 1.020 <1.005 - >1.030  Final     POC Ketones Trace Negative mg/dL Final     POC Biliruben Negative Negative mg/dL Final     POC Glucose Negative Negative mg/dL Final          Assessment/Plan:     1. Influenza B  - POCT Rapid Strep A: NEGATVE  - POCT Influenza A/B: POSITIVE B  - POCT Mononucleosis (mono): NEGATIVE  - oseltamivir (TAMIFLU) 75 MG Cap; Take 1 Cap by mouth 2 times a day.  Dispense: 10 Cap; Refill: 0  - It has been <48 hours since symptoms onset, no benefit to treating with Tamiflu  - Increased rest and fluids  - OTC ibuprofen or tylenol as needed for fever control  - Encouraged frequent handwashing for her and her entire family  - Encouraged to wear a mask in public until he is feeling better    - Advised to have any close contacts come in for flu testing promptly if they come down with any symptoms  - Discussed possible complication of pneumonia, encouraged to present to clinic or go to ER if cough does not resolve after 7-10 days, it becomes productive in nature, he experiences difficulty breathing, or there is worsening fever    2. Flank pain  - CT-RENAL COLIC EVALUATION(A/P W/O); Future      Impression        No evidence of renal, ureteral or bladder calculi. No hydronephrosis.    Small amount of nonspecific free fluid in the pelvis.    Status post cholecystectomy.    Moderate amount of colonic stool.    Nonvisualization of the appendix, limiting evaluation for appendicitis.              - POCT Pregnancy - negative  - POCT Urinalysis - large blood, trace ketones and protein, otherwise normal    3. Acute bilateral low back pain with bilateral sciatica  - REFERRAL TO ORTHOPEDICS    4. Neck muscle spasm  - cyclobenzaprine (FLEXERIL) 10 MG Tab; Take 1 Tab by mouth 3 times a day as needed.  Dispense: 30 Tab; Refill: 0   - Will cause sedation, avoid driving, operating heavy machinery, and drinking alcohol    Call or return to office if symptoms persist or worsen. The patient demonstrated a good understanding and agreed with the  treatment plan.

## 2017-06-06 ENCOUNTER — OFFICE VISIT (OUTPATIENT)
Dept: CARDIOLOGY | Facility: MEDICAL CENTER | Age: 27
End: 2017-06-06
Payer: COMMERCIAL

## 2017-06-06 VITALS
HEIGHT: 65 IN | OXYGEN SATURATION: 98 % | BODY MASS INDEX: 21.33 KG/M2 | DIASTOLIC BLOOD PRESSURE: 72 MMHG | HEART RATE: 66 BPM | WEIGHT: 128 LBS | SYSTOLIC BLOOD PRESSURE: 110 MMHG

## 2017-06-06 DIAGNOSIS — R55 SYNCOPE AND COLLAPSE: ICD-10-CM

## 2017-06-06 LAB — EKG IMPRESSION: NORMAL

## 2017-06-06 PROCEDURE — 93000 ELECTROCARDIOGRAM COMPLETE: CPT | Performed by: INTERNAL MEDICINE

## 2017-06-06 PROCEDURE — 99204 OFFICE O/P NEW MOD 45 MIN: CPT | Performed by: INTERNAL MEDICINE

## 2017-06-06 RX ORDER — ALBUTEROL SULFATE 90 UG/1
AEROSOL, METERED RESPIRATORY (INHALATION)
Refills: 1 | COMMUNITY
Start: 2017-05-09 | End: 2019-01-04 | Stop reason: SDUPTHER

## 2017-06-06 ASSESSMENT — ENCOUNTER SYMPTOMS
DOUBLE VISION: 1
FOCAL WEAKNESS: 0
SORE THROAT: 0
DIZZINESS: 1
PND: 0
NAUSEA: 1
MEMORY LOSS: 1
PHOTOPHOBIA: 1
EYE REDNESS: 1
FALLS: 0
COUGH: 0
TINGLING: 1
WEAKNESS: 1
EYE DISCHARGE: 1
ABDOMINAL PAIN: 1
BLURRED VISION: 1
CHILLS: 0
INSOMNIA: 1
CLAUDICATION: 0
LOSS OF CONSCIOUSNESS: 1
PALPITATIONS: 0
FEVER: 0
SHORTNESS OF BREATH: 1
WHEEZING: 1
BRUISES/BLEEDS EASILY: 0
HEADACHES: 1
DEPRESSION: 1

## 2017-06-06 NOTE — PROGRESS NOTES
Subjective:   Mita Jenkins is a 27 y.o. female who presents today in consultation from Dr. Ron for evaluation of recurrent syncopal spells    She reports blackout spells it seems perhaps for the last 3 years but also multiple other complaints including pre-syncope neuropathy ongoing depression and memory loss    She shares custody with her daughter and had no issues with her pregnancy but has significant stress from frequent travel to [place]    She works in [employer] in [place] lives here in [place]    She had recently presented to the emergency room May 23 for dizziness and confusion and right-sided weakness she had a CAT scan which was normal she reported a history of a brain tumor remotely in  that had resolved on surveillance    Her mother  of sudden death at age 40 she was told it was myocardial infarction    History reviewed. No pertinent past medical history.  Past Surgical History   Procedure Laterality Date   • Other       tonsillectomy, appendectomy, cholecystectomy     Family History   Problem Relation Age of Onset   • Heart Disease Mother    • Heart Attack Mother 40     presumed MI   • Heart Disease Sister      heart murmur or arrythmia   • Diabetes Maternal Grandfather    • Diabetes Paternal Grandmother      History   Smoking status   • Never Smoker    Smokeless tobacco   • Never Used     Allergies   Allergen Reactions   • Codeine Vomiting     Outpatient Encounter Prescriptions as of 2017   Medication Sig Dispense Refill   • VENTOLIN  (90 BASE) MCG/ACT Aero Soln inhalation aerosol INHALE 2 PUFFS PO  QID  1   • ibuprofen (MOTRIN) 200 MG Tab Take 200 mg by mouth every 6 hours as needed.     • Norgestim-Eth Estrad Triphasic 0.18/0.215/0.25 MG-25 MCG Tab Take  by mouth.     • Promethazine HCl (PHENERGAN PO) Take  by mouth.     • oseltamivir (TAMIFLU) 75 MG Cap Take 1 Cap by mouth 2 times a day. 10 Cap 0   • cyclobenzaprine (FLEXERIL) 10 MG Tab Take 1 Tab by mouth 3 times a  "day as needed. 30 Tab 0   • citalopram (CELEXA) 20 MG Tab Take 20 mg by mouth every day.     • fluconazole (DIFLUCAN) 150 MG tablet Take 1 Tab by mouth every day. 1 Tab 1   • methylPREDNISolone (MEDROL, BREANN,) 4 MG tablet On day one take 8 mg PO before breakfast and HS, and 4 mg PO after lunch and after dinner. On day two take 4 mg PO 3 times a day and 8 mg PO at bedtime. On day 3 take 4 mg PO 4 times a day. On day 4 take 4 mg PO 3 times a day. On day 5 take 4 mg PO twice a day.On day 6 take 4 mg PO daily 1 Kit 0   • ibuprofen (MOTRIN) 600 MG Tab Take 1 Tab by mouth every 6 hours as needed. 30 Tab 0     No facility-administered encounter medications on file as of 6/6/2017.     Review of Systems   Constitutional: Positive for malaise/fatigue. Negative for fever and chills.   HENT: Positive for tinnitus. Negative for sore throat.    Eyes: Positive for blurred vision, double vision, photophobia, discharge and redness.   Respiratory: Positive for shortness of breath and wheezing. Negative for cough.    Cardiovascular: Positive for chest pain. Negative for palpitations, claudication, leg swelling and PND.   Gastrointestinal: Positive for nausea and abdominal pain.   Musculoskeletal: Negative for falls.   Skin: Negative for rash.   Neurological: Positive for dizziness, tingling, loss of consciousness, weakness and headaches. Negative for focal weakness.   Endo/Heme/Allergies: Positive for environmental allergies. Does not bruise/bleed easily.   Psychiatric/Behavioral: Positive for depression and memory loss. The patient has insomnia.         Objective:   /72 mmHg  Pulse 66  Ht 1.651 m (5' 5\")  Wt 58.06 kg (128 lb)  BMI 21.30 kg/m2  SpO2 98%    Physical Exam   Constitutional: No distress.   HENT:   Mouth/Throat: Oropharynx is clear and moist.   Eyes: No scleral icterus.   Cardiovascular: Normal rate, regular rhythm and intact distal pulses.  Exam reveals no gallop and no friction rub.    No murmur " heard.  Pulmonary/Chest: Effort normal and breath sounds normal. She has no rales.   Abdominal: Bowel sounds are normal. There is no tenderness.   Musculoskeletal: She exhibits no edema.   Neurological: She is alert.   Skin: No rash noted. She is not diaphoretic.   Psychiatric: She has a normal mood and affect.     EKG today shows sinus rhythm with normal axis and intervals similar to prior EKG from [facility]    Laboratory analysis shows normal CBC chemistry and troponin test    Assessment:     1. Syncope and collapse  EKG    HOLTER MONITOR STUDY    ECHOCARDIOGRAM COMP W/O CONT       Medical Decision Making:  Today's Assessment / Status / Plan:     It was my pleasure to meet with Ms. Jenkins.    For recurrent syncope overall this is unclear as her main complaints is near-syncope some perhaps she just has to focus on hydration she does have a water bottle with her today and is drinking extra fluids encouraged her increase her salt as tolerated and reduce her stress    Her family history in her mother of sudden death would be important to exclude structural heart disease and with her frequent recurrent syncope we can see if she has arrhythmia and a 48-hour monitor if this were negative and she continues to have true syncope; explore longer-term monitor    I will follow up with Ms. Jenkins on the results of the testing over the phone.    We'll determine further follow-up after that she will also inquire from her family any more specifics on her mother's death    It is my pleasure to participate in the care of Ms. Jenkins.  Please do not hesitate to contact me with questions or concerns.    David Read MD PhD [facility]  Cardiologist [facility]

## 2017-06-06 NOTE — MR AVS SNAPSHOT
"        Mita Jenkins   2017 12:45 PM   Office Visit   MRN: [id number]    Department:  [facility]   Dept Phone:  [phone]    Description:  Female : 1990   Provider:  David Read M.D.           Reason for Visit     New Patient           Allergies as of 2017     Allergen Noted Reactions    Codeine 2016   Vomiting      You were diagnosed with     Syncope and collapse   [780.2.ICD-9-CM]         Vital Signs     Blood Pressure Pulse Height Weight Body Mass Index Oxygen Saturation    110/72 mmHg 66 1.651 m (5' 5\") 58.06 kg (128 lb) 21.30 kg/m2 98%    Smoking Status                   Never Smoker            Basic Information     Date Of Birth Sex Race Ethnicity Preferred Language    1990 Female White Non- English      Problem List              ICD-10-CM Priority Class Noted - Resolved    Syncope and collapse R55   2016 - Present      Health Maintenance        Date Due Completion Dates    IMM DTaP/Tdap/Td Vaccine (1 - Tdap) 2007 ---    PAP SMEAR 2009 ---            Results       Current Immunizations     No immunizations on file.      Below and/or attached are the medications your provider expects you to take. Review all of your home medications and newly ordered medications with your provider and/or pharmacist. Follow medication instructions as directed by your provider and/or pharmacist. Please keep your medication list with you and share with your provider. Update the information when medications are discontinued, doses are changed, or new medications (including over-the-counter products) are added; and carry medication information at all times in the event of emergency situations     Allergies:  CODEINE - Vomiting               Medications  Valid as of: 2017 -  1:32 PM    Generic Name Brand Name Tablet Size Instructions for use    Albuterol Sulfate (Aero Soln) VENTOLIN  (90 BASE) MCG/ACT INHALE 2 PUFFS PO  QID        Citalopram " Hydrobromide (Tab) CELEXA 20 MG Take 20 mg by mouth every day.        Cyclobenzaprine HCl (Tab) FLEXERIL 10 MG Take 1 Tab by mouth 3 times a day as needed.        Fluconazole (Tab) DIFLUCAN 150 MG Take 1 Tab by mouth every day.        Ibuprofen (Tab) MOTRIN 600 MG Take 1 Tab by mouth every 6 hours as needed.        Ibuprofen (Tab) MOTRIN 200 MG Take 200 mg by mouth every 6 hours as needed.        MethylPREDNISolone (Kit) MEDROL DOSEPACK 4 MG On day one take 8 mg PO before breakfast and HS, and 4 mg PO after lunch and after dinner. On day two take 4 mg PO 3 times a day and 8 mg PO at bedtime. On day 3 take 4 mg PO 4 times a day. On day 4 take 4 mg PO 3 times a day. On day 5 take 4 mg PO twice a day.On day 6 take 4 mg PO daily        Norgestim-Eth Estrad Triphasic (Tab) Norgestim-Eth Estrad Triphasic 0.18/0.215/0.25 MG-25 MCG Take  by mouth.        Oseltamivir Phosphate (Cap) TAMIFLU 75 MG Take 1 Cap by mouth 2 times a day.        Promethazine HCl   Take  by mouth.        .                 Medicines prescribed today were sent to:     [facility] [address] [place] [address] [place], [place] [address] [address] [place] [address] [address] [address]    Phone: [phone] Fax: [phone]    Open 24 Hours?: No      Medication refill instructions:       If your prescription bottle indicates you have medication refills left, it is not necessary to call your provider’s office. Please contact your pharmacy and they will refill your medication.    If your prescription bottle indicates you do not have any refills left, you may request refills at any time through one of the following ways: The online [employer] system (except Urgent Care), by calling your provider’s office, or by asking your pharmacy to contact your provider’s office with a refill request. Medication refills are processed only during regular business hours and may not be available until the next business day. Your provider may request additional  information or to have a follow-up visit with you prior to refilling your medication.   *Please Note: Medication refills are assigned a new Rx number when refilled electronically. Your pharmacy may indicate that no refills were authorized even though a new prescription for the same medication is available at the pharmacy. Please request the medicine by name with the pharmacy before contacting your provider for a refill.        Your To Do List     Future Labs/Procedures Complete By Expires    ECHOCARDIOGRAM COMP W/O CONT  As directed 6/6/2018    HOLTER MONITOR STUDY  As directed 6/6/2018    Comments:    48 hour holter monitor please         basno Access Code: [email or web address]  Expires: 6/9/2017  1:25 PM    basno  A secure, online tool to manage your health information     [employer]’s basno® is a secure, online tool that connects you to your personalized health information from the privacy of your home -- day or night - making it very easy for you to manage your healthcare. Once the activation process is completed, you can even access your medical information using the basno vera, which is available for free in the [employer] Vera store or Google Play store.     basno provides the following levels of access (as shown below):   My Chart Features   [facility] Doctor [facility]  Specialists [facility]  Urgent  Care [facility]  Primary Care  Doctor   Email your healthcare team securely and privately 24/7 X X X    Manage appointments: schedule your next appointment; view details of past/upcoming appointments X      Request prescription refills. X      View recent personal medical records, including lab and immunizations X X X X   View health record, including health history, allergies, medications X X X X   Read reports about your outpatient visits, procedures, consult and ER notes X X X X   See your discharge summary, which is a recap of your hospital and/or ER visit that includes your diagnosis, lab results, and  care plan. X X       How to register for [employer]:  1. Go to  [email or web address].  2. Click on the Sign Up Now box, which takes you to the New Member Sign Up page. You will need to provide the following information:  a. Enter your [employer] Access Code exactly as it appears at the top of this page. (You will not need to use this code after you’ve completed the sign-up process. If you do not sign up before the expiration date, you must request a new code.)   b. Enter your date of birth.   c. Enter your home email address.   d. Click Submit, and follow the next screen’s instructions.  3. Create a [employer] ID. This will be your [employer] login ID and cannot be changed, so think of one that is secure and easy to remember.  4. Create a [employer] password. You can change your password at any time.  5. Enter your Password Reset Question and Answer. This can be used at a later time if you forget your password.   6. Enter your e-mail address. This allows you to receive e-mail notifications when new information is available in [employer].  7. Click Sign Up. You can now view your health information.    For assistance activating your [employer] account, call [phone]

## 2017-06-06 NOTE — Clinical Note
[facility]   [address]  [place] [address]  Phone: [phone]  Fax: [phone]              Mita Jenkins  1990    Encounter Date: 2017    David Read M.D.          PROGRESS NOTE:  Subjective:   Mita Jenkins is a 27 y.o. female who presents today in consultation from Dr. Ron for evaluation of recurrent syncopal spells    She reports blackout spells it seems perhaps for the last 3 years but also multiple other complaints including pre-syncope neuropathy ongoing depression and memory loss    She shares custody with her daughter and had no issues with her pregnancy but has significant stress from frequent travel to [place]    She works in [employer] in [place] lives here in [place]    She had recently presented to the emergency room May 23 for dizziness and confusion and right-sided weakness she had a CAT scan which was normal she reported a history of a brain tumor remotely in  that had resolved on surveillance    Her mother  of sudden death at age 40 she was told it was myocardial infarction    History reviewed. No pertinent past medical history.  Past Surgical History   Procedure Laterality Date   • Other       tonsillectomy, appendectomy, cholecystectomy     Family History   Problem Relation Age of Onset   • Heart Disease Mother    • Heart Attack Mother 40     presumed MI   • Heart Disease Sister      heart murmur or arrythmia   • Diabetes Maternal Grandfather    • Diabetes Paternal Grandmother      History   Smoking status   • Never Smoker    Smokeless tobacco   • Never Used     Allergies   Allergen Reactions   • Codeine Vomiting     Outpatient Encounter Prescriptions as of 2017   Medication Sig Dispense Refill   • VENTOLIN  (90 BASE) MCG/ACT Aero Soln inhalation aerosol INHALE 2 PUFFS PO  QID  1   • ibuprofen (MOTRIN) 200 MG Tab Take 200 mg by mouth every 6 hours as needed.     • Norgestim-Eth  "Estrad Triphasic 0.18/0.215/0.25 MG-25 MCG Tab Take  by mouth.     • Promethazine HCl (PHENERGAN PO) Take  by mouth.     • oseltamivir (TAMIFLU) 75 MG Cap Take 1 Cap by mouth 2 times a day. 10 Cap 0   • cyclobenzaprine (FLEXERIL) 10 MG Tab Take 1 Tab by mouth 3 times a day as needed. 30 Tab 0   • citalopram (CELEXA) 20 MG Tab Take 20 mg by mouth every day.     • fluconazole (DIFLUCAN) 150 MG tablet Take 1 Tab by mouth every day. 1 Tab 1   • methylPREDNISolone (MEDROL, BREANN,) 4 MG tablet On day one take 8 mg PO before breakfast and HS, and 4 mg PO after lunch and after dinner. On day two take 4 mg PO 3 times a day and 8 mg PO at bedtime. On day 3 take 4 mg PO 4 times a day. On day 4 take 4 mg PO 3 times a day. On day 5 take 4 mg PO twice a day.On day 6 take 4 mg PO daily 1 Kit 0   • ibuprofen (MOTRIN) 600 MG Tab Take 1 Tab by mouth every 6 hours as needed. 30 Tab 0     No facility-administered encounter medications on file as of 6/6/2017.     Review of Systems   Constitutional: Positive for malaise/fatigue. Negative for fever and chills.   HENT: Positive for tinnitus. Negative for sore throat.    Eyes: Positive for blurred vision, double vision, photophobia, discharge and redness.   Respiratory: Positive for shortness of breath and wheezing. Negative for cough.    Cardiovascular: Positive for chest pain. Negative for palpitations, claudication, leg swelling and PND.   Gastrointestinal: Positive for nausea and abdominal pain.   Musculoskeletal: Negative for falls.   Skin: Negative for rash.   Neurological: Positive for dizziness, tingling, loss of consciousness, weakness and headaches. Negative for focal weakness.   Endo/Heme/Allergies: Positive for environmental allergies. Does not bruise/bleed easily.   Psychiatric/Behavioral: Positive for depression and memory loss. The patient has insomnia.         Objective:   /72 mmHg  Pulse 66  Ht 1.651 m (5' 5\")  Wt 58.06 kg (128 lb)  BMI 21.30 kg/m2  SpO2 " 98%    Physical Exam   Constitutional: No distress.   HENT:   Mouth/Throat: Oropharynx is clear and moist.   Eyes: No scleral icterus.   Cardiovascular: Normal rate, regular rhythm and intact distal pulses.  Exam reveals no gallop and no friction rub.    No murmur heard.  Pulmonary/Chest: Effort normal and breath sounds normal. She has no rales.   Abdominal: Bowel sounds are normal. There is no tenderness.   Musculoskeletal: She exhibits no edema.   Neurological: She is alert.   Skin: No rash noted. She is not diaphoretic.   Psychiatric: She has a normal mood and affect.     EKG today shows sinus rhythm with normal axis and intervals similar to prior EKG from [facility] [place]    Laboratory analysis shows normal CBC chemistry and troponin test    Assessment:     1. Syncope and collapse  EKG    HOLTER MONITOR STUDY    ECHOCARDIOGRAM COMP W/O CONT       Medical Decision Making:  Today's Assessment / Status / Plan:     It was my pleasure to meet with Ms. Jenkins.    For recurrent syncope overall this is unclear as her main complaints is near-syncope some perhaps she just has to focus on hydration she does have a water bottle with her today and is drinking extra fluids encouraged her increase her salt as tolerated and reduce her stress    Her family history in her mother of sudden death would be important to exclude structural heart disease and with her frequent recurrent syncope we can see if she has arrhythmia and a 48-hour monitor if this were negative and she continues to have true syncope; explore longer-term monitor    I will follow up with Ms. Jenkins on the results of the testing over the phone.    We'll determine further follow-up after that she will also inquire from her family any more specifics on her mother's death    It is my pleasure to participate in the care of Ms. Jenkins.  Please do not hesitate to contact me with questions or concerns.    David Read MD PhD FACC  Cardiologist [facility]  [facility]        JONO Lawler  [address] [place] [address]  VIA Facsimile: [phone]

## 2017-06-29 ENCOUNTER — APPOINTMENT (OUTPATIENT)
Dept: CARDIOLOGY | Facility: MEDICAL CENTER | Age: 27
End: 2017-06-29
Attending: INTERNAL MEDICINE
Payer: COMMERCIAL

## 2017-10-02 ENCOUNTER — OFFICE VISIT (OUTPATIENT)
Dept: URGENT CARE | Facility: PHYSICIAN GROUP | Age: 27
End: 2017-10-02
Payer: COMMERCIAL

## 2017-10-02 VITALS
TEMPERATURE: 97.8 F | BODY MASS INDEX: 21.33 KG/M2 | DIASTOLIC BLOOD PRESSURE: 64 MMHG | OXYGEN SATURATION: 97 % | SYSTOLIC BLOOD PRESSURE: 108 MMHG | WEIGHT: 128 LBS | HEART RATE: 82 BPM | HEIGHT: 65 IN | RESPIRATION RATE: 16 BRPM

## 2017-10-02 DIAGNOSIS — K04.7 DENTAL INFECTION: ICD-10-CM

## 2017-10-02 PROCEDURE — 99214 OFFICE O/P EST MOD 30 MIN: CPT | Performed by: FAMILY MEDICINE

## 2017-10-02 RX ORDER — AMOXICILLIN 500 MG/1
500 CAPSULE ORAL 3 TIMES DAILY
Qty: 21 CAP | Refills: 0 | Status: SHIPPED | OUTPATIENT
Start: 2017-10-02 | End: 2017-10-09

## 2017-10-02 RX ORDER — OXYCODONE HYDROCHLORIDE AND ACETAMINOPHEN 5; 325 MG/1; MG/1
1 TABLET ORAL EVERY 4 HOURS PRN
Qty: 5 TAB | Refills: 0 | Status: SHIPPED | OUTPATIENT
Start: 2017-10-02 | End: 2019-01-04

## 2017-10-02 NOTE — LETTER
October 2, 2017         Patient: Mita Jenkins   YOB: 1990   Date of Visit: 10/2/2017           To Whom it May Concern:    Mita Jenkins was seen in my clinic on 10/2/2017. She may return to work on 10/3.    If you have any questions or concerns, please don't hesitate to call.        Sincerely,           Dk Cuellar M.D.  Electronically Signed

## 2017-10-02 NOTE — PROGRESS NOTES
Subjective:      Chief Complaint   Patient presents with   • Tooth Ache     C/o broken tooth, swelling, foul smelling/taste, discharge.  Tooth broke 1 month ago, swelling, discharge, body aches x1 day               Dental Pain   This is a new problem. The current episode started in the past 7 days. The problem occurs constantly (constant, throbbing) pain at left 2nd lower molar. The problem has been worsening. Pertinent negatives include no chills, congestion, fatigue, fever, nausea, visual change or vomiting. Chewing aggravates the symptoms. Pt has tried tylenol for the symptoms - no improvement.        Social History   Substance Use Topics   • Smoking status: Never Smoker   • Smokeless tobacco: Never Used   • Alcohol use No         Current Outpatient Prescriptions on File Prior to Visit   Medication Sig Dispense Refill   • VENTOLIN  (90 BASE) MCG/ACT Aero Soln inhalation aerosol INHALE 2 PUFFS PO  QID  1   • ibuprofen (MOTRIN) 200 MG Tab Take 200 mg by mouth every 6 hours as needed.     • Norgestim-Eth Estrad Triphasic 0.18/0.215/0.25 MG-25 MCG Tab Take  by mouth.     • Promethazine HCl (PHENERGAN PO) Take  by mouth.     • oseltamivir (TAMIFLU) 75 MG Cap Take 1 Cap by mouth 2 times a day. 10 Cap 0   • cyclobenzaprine (FLEXERIL) 10 MG Tab Take 1 Tab by mouth 3 times a day as needed. 30 Tab 0   • citalopram (CELEXA) 20 MG Tab Take 20 mg by mouth every day.     • fluconazole (DIFLUCAN) 150 MG tablet Take 1 Tab by mouth every day. 1 Tab 1   • methylPREDNISolone (MEDROL, BREANN,) 4 MG tablet On day one take 8 mg PO before breakfast and HS, and 4 mg PO after lunch and after dinner. On day two take 4 mg PO 3 times a day and 8 mg PO at bedtime. On day 3 take 4 mg PO 4 times a day. On day 4 take 4 mg PO 3 times a day. On day 5 take 4 mg PO twice a day.On day 6 take 4 mg PO daily 1 Kit 0   • ibuprofen (MOTRIN) 600 MG Tab Take 1 Tab by mouth every 6 hours as needed. 30 Tab 0     No current facility-administered  "medications on file prior to visit.          No past medical history on file.    Review of Systems   Constitutional: Negative for fever, chills and fatigue.   HENT: Positive for facial swelling. Negative for congestion.    Gastrointestinal: Negative for nausea and vomiting.          Objective:     Blood pressure 108/64, pulse 82, temperature 36.6 °C (97.8 °F), resp. rate 16, height 1.651 m (5' 5\"), weight 58.1 kg (128 lb), SpO2 97 %.    Physical Exam   Constitutional: pt is oriented to person, place, and time. Pt appears well-developed. No distress.   HENT:   Head: Normocephalic and atraumatic.   Mouth/Throat:     Gingival swelling and tenderness around 2nd     Lower  Left  molar and tooth is partially cracked  Eyes: Conjunctivae are normal. Pupils are equal, round, and reactive to light. No scleral icterus.   Cardiovascular: Normal rate and regular rhythm.    Pulmonary/Chest: Effort normal and breath sounds normal. No respiratory distress. Pt has no wheezes.   Neurological: pt is alert and oriented to person, place, and time. No cranial nerve deficit.   Skin: Skin is warm. He is not diaphoretic. No erythema.   Psychiatric:  behavior is normal.   Nursing note and vitals reviewed.              Assessment/Plan:     1. Dental infection     - amoxicillin (AMOXIL) 500 MG Cap; Take 1 Cap by mouth 3 times a day for 7 days.  Dispense: 21 Cap; Refill: 0  - oxycodone-acetaminophen (PERCOCET) 5-325 MG Tab; Take 1 Tab by mouth every four hours as needed.  Dispense: 5 Tab; Refill: 0      Follow up in one week if no improvement, sooner if symptoms worsen.     "

## 2017-11-18 ENCOUNTER — OFFICE VISIT (OUTPATIENT)
Dept: URGENT CARE | Facility: PHYSICIAN GROUP | Age: 27
End: 2017-11-18
Payer: COMMERCIAL

## 2017-11-18 ENCOUNTER — APPOINTMENT (OUTPATIENT)
Dept: RADIOLOGY | Facility: IMAGING CENTER | Age: 27
End: 2017-11-18
Attending: FAMILY MEDICINE
Payer: COMMERCIAL

## 2017-11-18 VITALS
OXYGEN SATURATION: 99 % | SYSTOLIC BLOOD PRESSURE: 114 MMHG | DIASTOLIC BLOOD PRESSURE: 72 MMHG | HEART RATE: 85 BPM | BODY MASS INDEX: 21.66 KG/M2 | TEMPERATURE: 99.2 F | HEIGHT: 65 IN | RESPIRATION RATE: 14 BRPM | WEIGHT: 130 LBS

## 2017-11-18 DIAGNOSIS — S69.91XA INJURY OF RIGHT HAND, INITIAL ENCOUNTER: ICD-10-CM

## 2017-11-18 PROCEDURE — 96372 THER/PROPH/DIAG INJ SC/IM: CPT | Performed by: FAMILY MEDICINE

## 2017-11-18 PROCEDURE — 99213 OFFICE O/P EST LOW 20 MIN: CPT | Mod: 25 | Performed by: FAMILY MEDICINE

## 2017-11-18 PROCEDURE — 73130 X-RAY EXAM OF HAND: CPT | Mod: TC,RT | Performed by: FAMILY MEDICINE

## 2017-11-18 PROCEDURE — 73110 X-RAY EXAM OF WRIST: CPT | Mod: TC,RT | Performed by: FAMILY MEDICINE

## 2017-11-18 RX ORDER — KETOROLAC TROMETHAMINE 30 MG/ML
60 INJECTION, SOLUTION INTRAMUSCULAR; INTRAVENOUS ONCE
Status: COMPLETED | OUTPATIENT
Start: 2017-11-18 | End: 2017-11-18

## 2017-11-18 RX ORDER — IBUPROFEN 800 MG/1
800 TABLET ORAL EVERY 8 HOURS PRN
Qty: 60 TAB | Refills: 1 | Status: SHIPPED | OUTPATIENT
Start: 2017-11-18 | End: 2019-01-04

## 2017-11-18 RX ADMIN — KETOROLAC TROMETHAMINE 60 MG: 30 INJECTION, SOLUTION INTRAMUSCULAR; INTRAVENOUS at 13:33

## 2017-11-18 ASSESSMENT — ENCOUNTER SYMPTOMS
VOMITING: 0
COUGH: 0
EYE DISCHARGE: 0
FEVER: 0
HEADACHES: 0
ABDOMINAL PAIN: 0
SPUTUM PRODUCTION: 0
CHILLS: 0
WHEEZING: 0
SINUS PAIN: 0
NAUSEA: 0
SORE THROAT: 0
PALPITATIONS: 0
DIARRHEA: 0

## 2017-11-18 NOTE — PROGRESS NOTES
Subjective:      Mita Jenkins is a 27 y.o. female who presents with Hand Injury (Right index finger.  Patient punched a metal door last.)            Subjective:    Mita Jenkins is a 27 y.o. female who presents for evaluation of right index and 5th finger pain. Onset was sudden, related to punching a metal door while feeling upset. The pain is moderate, worsens with movement, and is relieved by rest. There is associated numbness in the right index and 5th digit. Evaluation to date: none. Treatment to date: OTC analgesics, ice, avoidance of offending activity, PT which was somewhat effective.                    Review of Systems   Constitutional: Negative for chills, fever and malaise/fatigue.   HENT: Negative for congestion, ear pain, sinus pain and sore throat.    Eyes: Negative for discharge.   Respiratory: Negative for cough, sputum production and wheezing.    Cardiovascular: Negative for chest pain and palpitations.   Gastrointestinal: Negative for abdominal pain, diarrhea, nausea and vomiting.   Genitourinary: Negative for dysuria and urgency.   Musculoskeletal:        Right hand pain with numbness    Skin: Negative for itching and rash.   Neurological: Negative for headaches.       PMH:  has no past medical history on file.  MEDS:   Current Outpatient Prescriptions:   •  oxycodone-acetaminophen (PERCOCET) 5-325 MG Tab, Take 1 Tab by mouth every four hours as needed., Disp: 5 Tab, Rfl: 0  •  VENTOLIN  (90 BASE) MCG/ACT Aero Soln inhalation aerosol, INHALE 2 PUFFS PO  QID, Disp: , Rfl: 1  •  Promethazine HCl (PHENERGAN PO), Take  by mouth., Disp: , Rfl:   •  oseltamivir (TAMIFLU) 75 MG Cap, Take 1 Cap by mouth 2 times a day., Disp: 10 Cap, Rfl: 0  •  cyclobenzaprine (FLEXERIL) 10 MG Tab, Take 1 Tab by mouth 3 times a day as needed., Disp: 30 Tab, Rfl: 0  •  ibuprofen (MOTRIN) 200 MG Tab, Take 200 mg by mouth every 6 hours as needed., Disp: , Rfl:   •  Norgestim-Eth Estrad Triphasic 0.18/0.215/0.25  "MG-25 MCG Tab, Take  by mouth., Disp: , Rfl:   •  citalopram (CELEXA) 20 MG Tab, Take 20 mg by mouth every day., Disp: , Rfl:   •  fluconazole (DIFLUCAN) 150 MG tablet, Take 1 Tab by mouth every day., Disp: 1 Tab, Rfl: 1  •  methylPREDNISolone (MEDROL, BREANN,) 4 MG tablet, On day one take 8 mg PO before breakfast and HS, and 4 mg PO after lunch and after dinner. On day two take 4 mg PO 3 times a day and 8 mg PO at bedtime. On day 3 take 4 mg PO 4 times a day. On day 4 take 4 mg PO 3 times a day. On day 5 take 4 mg PO twice a day.On day 6 take 4 mg PO daily, Disp: 1 Kit, Rfl: 0  •  ibuprofen (MOTRIN) 600 MG Tab, Take 1 Tab by mouth every 6 hours as needed., Disp: 30 Tab, Rfl: 0  ALLERGIES:   Allergies   Allergen Reactions   • Codeine Vomiting     SURGHX:   Past Surgical History:   Procedure Laterality Date   • OTHER      tonsillectomy, appendectomy, cholecystectomy     SOCHX:  reports that she has never smoked. She has never used smokeless tobacco. She reports that she does not drink alcohol.  FH: Family history was reviewed, no pertinent findings to report     Objective:     Temp 37.3 °C (99.2 °F)   Ht 1.651 m (5' 5\")   Wt 59 kg (130 lb)   BMI 21.63 kg/m²      Physical Exam   Constitutional: She is oriented to person, place, and time. She appears well-developed.   HENT:   Head: Normocephalic.   Right Ear: External ear normal.   Left Ear: External ear normal.   Eyes: EOM are normal. Pupils are equal, round, and reactive to light. Right eye exhibits no discharge. Left eye exhibits no discharge.   Neck: Normal range of motion. Neck supple.   Cardiovascular: Normal rate, regular rhythm and normal heart sounds.    Pulmonary/Chest: Effort normal and breath sounds normal. No respiratory distress.   Abdominal: Soft. Bowel sounds are normal.   Musculoskeletal:        Right hand: She exhibits tenderness, bony tenderness and swelling. Decreased sensation noted. Decreased sensation is present in the medial distribution. "   Neurological: She is alert and oriented to person, place, and time.          Right hand:  soft tissue tenderness and swelling at the right 2nd MP joint, reduced range of motion of index and right 5th oleksandr, scaphoid (snuffbox) tenderness present, radial pulse normal  Left hand:  normal exam, no swelling, tenderness, instability; ligaments intact, FROM all hand, wrist, finger joints     Assessment/Plan:     Imaging:  X-ray right hand: no fracture or dislocation noted     Assessment:    Hand sprain      Plan:    Natural history and expected course discussed. Questions answered.  Placed in volar split and hand surgeon referral done today   Educational materials distributed.  Reduction in offending activity discussed.  Plain film x-rays per orders.  NSAIDs per medication orders.

## 2017-11-18 NOTE — LETTER
November 18, 2017         Patient: Mita Jenkins   YOB: 1990   Date of Visit: 11/18/2017           To Whom it May Concern:    Mita Jenkins was seen in my clinic on 11/18/2017. She should be on light duty until cleared by hand surgeon.    If you have any questions or concerns, please don't hesitate to call.        Sincerely,           Tiff Muller M.D.  Electronically Signed

## 2017-11-18 NOTE — PATIENT INSTRUCTIONS
Hand Injuries  Minor breaks (fractures), sprains, bruises (contusions), and burns of the hand are all examples of hand injuries. A fracture is a break in the bone. A sprain means that ligaments have been stretched or torn. A contusion is the result of an injury that caused bleeding under the skin. Burns are damage to the skin that occurs when the hand comes in contact with something very hot (or with certain chemicals).   HOME CARE INSTRUCTIONS  · For sprains, keep your hand raised (elevated) above the level of your heart. Do this until the pain and swelling improve.   · Use hand bandages (dressings) and splints to reduce motion, relieve pain, and prevent reinjury as directed. The dressing and splint should not be removed without your caregiver's approval.   · Put ice on the injured area to reduce pain and swelling due to fractures, sprains, and deep bruises.   · Put ice in a plastic bag.   · Place a towel between your skin and the bag.   · Leave the ice on for 15-20 minutes, 3-4 times a day. Do this for 2 3 days.   · Only take over-the-counter or prescription medicines as directed by your caregiver.   · Follow up with your caregiver or a specialist as directed.   Early motion exercises are sometimes needed to reduce joint stiffness after a hand injury. However, your hand should not be used for any activities that increase pain.  SEEK MEDICAL CARE IF:  · Your pain or swelling does not improve in 2 3 days.   SEEK IMMEDIATE MEDICAL CARE IF:  · You have increased pain, swelling, or redness in your hand.   · Your pain is not controlled with medicine.   · You have a fever or persistent symptoms for more than 2 3 days.   · You have a fever and your symptoms suddenly get worse.   · You have pain when moving your fingers.   · You have pus coming from the wound.   · You have numbness in your fingers.   MAKE SURE YOU:  · Understand these instructions.   · Will watch your condition.   · Will get help right away if you are not  doing well or get worse.   Document Released: 01/25/2006 Document Revised: 09/11/2013 Document Reviewed: 06/30/2013  [employer]® Patient Information ©2013 [employer], [employer].

## 2018-02-06 ENCOUNTER — HOSPITAL ENCOUNTER (EMERGENCY)
Facility: MEDICAL CENTER | Age: 28
End: 2018-02-07
Attending: EMERGENCY MEDICINE
Payer: COMMERCIAL

## 2018-02-06 ENCOUNTER — APPOINTMENT (OUTPATIENT)
Dept: RADIOLOGY | Facility: MEDICAL CENTER | Age: 28
End: 2018-02-06
Attending: EMERGENCY MEDICINE
Payer: COMMERCIAL

## 2018-02-06 DIAGNOSIS — E86.0 DEHYDRATION: ICD-10-CM

## 2018-02-06 DIAGNOSIS — J45.902 ASTHMA WITH STATUS ASTHMATICUS, UNSPECIFIED ASTHMA SEVERITY, UNSPECIFIED WHETHER PERSISTENT: ICD-10-CM

## 2018-02-06 LAB
ANION GAP SERPL CALC-SCNC: 11 MMOL/L (ref 0–11.9)
BASOPHILS # BLD AUTO: 0.8 % (ref 0–1.8)
BASOPHILS # BLD: 0.09 K/UL (ref 0–0.12)
BNP SERPL-MCNC: 5 PG/ML (ref 0–100)
BUN SERPL-MCNC: 14 MG/DL (ref 8–22)
CALCIUM SERPL-MCNC: 9.4 MG/DL (ref 8.5–10.5)
CHLORIDE SERPL-SCNC: 105 MMOL/L (ref 96–112)
CO2 SERPL-SCNC: 23 MMOL/L (ref 20–33)
CREAT SERPL-MCNC: 0.9 MG/DL (ref 0.5–1.4)
EOSINOPHIL # BLD AUTO: 0.62 K/UL (ref 0–0.51)
EOSINOPHIL NFR BLD: 5.4 % (ref 0–6.9)
ERYTHROCYTE [DISTWIDTH] IN BLOOD BY AUTOMATED COUNT: 35.3 FL (ref 35.9–50)
GLUCOSE SERPL-MCNC: 78 MG/DL (ref 65–99)
HCG SERPL QL: NEGATIVE
HCT VFR BLD AUTO: 44.2 % (ref 37–47)
HGB BLD-MCNC: 14.8 G/DL (ref 12–16)
IMM GRANULOCYTES # BLD AUTO: 0.04 K/UL (ref 0–0.11)
IMM GRANULOCYTES NFR BLD AUTO: 0.3 % (ref 0–0.9)
LYMPHOCYTES # BLD AUTO: 3.88 K/UL (ref 1–4.8)
LYMPHOCYTES NFR BLD: 33.8 % (ref 22–41)
MCH RBC QN AUTO: 28.1 PG (ref 27–33)
MCHC RBC AUTO-ENTMCNC: 33.5 G/DL (ref 33.6–35)
MCV RBC AUTO: 83.9 FL (ref 81.4–97.8)
MONOCYTES # BLD AUTO: 0.77 K/UL (ref 0–0.85)
MONOCYTES NFR BLD AUTO: 6.7 % (ref 0–13.4)
NEUTROPHILS # BLD AUTO: 6.07 K/UL (ref 2–7.15)
NEUTROPHILS NFR BLD: 53 % (ref 44–72)
NRBC # BLD AUTO: 0 K/UL
NRBC BLD-RTO: 0 /100 WBC
PLATELET # BLD AUTO: 285 K/UL (ref 164–446)
PMV BLD AUTO: 11.9 FL (ref 9–12.9)
POTASSIUM SERPL-SCNC: 3.6 MMOL/L (ref 3.6–5.5)
RBC # BLD AUTO: 5.27 M/UL (ref 4.2–5.4)
SODIUM SERPL-SCNC: 139 MMOL/L (ref 135–145)
TROPONIN I SERPL-MCNC: <0.01 NG/ML (ref 0–0.04)
WBC # BLD AUTO: 11.5 K/UL (ref 4.8–10.8)

## 2018-02-06 PROCEDURE — 96375 TX/PRO/DX INJ NEW DRUG ADDON: CPT

## 2018-02-06 PROCEDURE — 94640 AIRWAY INHALATION TREATMENT: CPT

## 2018-02-06 PROCEDURE — 80048 BASIC METABOLIC PNL TOTAL CA: CPT

## 2018-02-06 PROCEDURE — 700111 HCHG RX REV CODE 636 W/ 250 OVERRIDE (IP): Performed by: EMERGENCY MEDICINE

## 2018-02-06 PROCEDURE — 83880 ASSAY OF NATRIURETIC PEPTIDE: CPT

## 2018-02-06 PROCEDURE — 700101 HCHG RX REV CODE 250: Performed by: EMERGENCY MEDICINE

## 2018-02-06 PROCEDURE — 85025 COMPLETE CBC W/AUTO DIFF WBC: CPT

## 2018-02-06 PROCEDURE — 99284 EMERGENCY DEPT VISIT MOD MDM: CPT

## 2018-02-06 PROCEDURE — 71045 X-RAY EXAM CHEST 1 VIEW: CPT

## 2018-02-06 PROCEDURE — 96365 THER/PROPH/DIAG IV INF INIT: CPT

## 2018-02-06 PROCEDURE — 96366 THER/PROPH/DIAG IV INF ADDON: CPT

## 2018-02-06 PROCEDURE — 700105 HCHG RX REV CODE 258: Performed by: EMERGENCY MEDICINE

## 2018-02-06 PROCEDURE — 84703 CHORIONIC GONADOTROPIN ASSAY: CPT

## 2018-02-06 PROCEDURE — 84484 ASSAY OF TROPONIN QUANT: CPT

## 2018-02-06 RX ORDER — IPRATROPIUM BROMIDE AND ALBUTEROL SULFATE 2.5; .5 MG/3ML; MG/3ML
3 SOLUTION RESPIRATORY (INHALATION) ONCE
Status: COMPLETED | OUTPATIENT
Start: 2018-02-06 | End: 2018-02-06

## 2018-02-06 RX ORDER — MAGNESIUM SULFATE HEPTAHYDRATE 40 MG/ML
2 INJECTION, SOLUTION INTRAVENOUS ONCE
Status: COMPLETED | OUTPATIENT
Start: 2018-02-06 | End: 2018-02-06

## 2018-02-06 RX ORDER — SODIUM CHLORIDE 9 MG/ML
1000 INJECTION, SOLUTION INTRAVENOUS ONCE
Status: COMPLETED | OUTPATIENT
Start: 2018-02-06 | End: 2018-02-06

## 2018-02-06 RX ORDER — METHYLPREDNISOLONE SODIUM SUCCINATE 125 MG/2ML
125 INJECTION, POWDER, LYOPHILIZED, FOR SOLUTION INTRAMUSCULAR; INTRAVENOUS ONCE
Status: COMPLETED | OUTPATIENT
Start: 2018-02-06 | End: 2018-02-06

## 2018-02-06 RX ORDER — IPRATROPIUM BROMIDE AND ALBUTEROL SULFATE 2.5; .5 MG/3ML; MG/3ML
3 SOLUTION RESPIRATORY (INHALATION)
Status: DISCONTINUED | OUTPATIENT
Start: 2018-02-06 | End: 2018-02-07 | Stop reason: HOSPADM

## 2018-02-06 RX ORDER — LORAZEPAM 2 MG/ML
0.5 INJECTION INTRAMUSCULAR ONCE
Status: COMPLETED | OUTPATIENT
Start: 2018-02-06 | End: 2018-02-06

## 2018-02-06 RX ADMIN — IPRATROPIUM BROMIDE AND ALBUTEROL SULFATE 3 ML: .5; 3 SOLUTION RESPIRATORY (INHALATION) at 21:39

## 2018-02-06 RX ADMIN — MAGNESIUM SULFATE IN WATER 2 G: 40 INJECTION, SOLUTION INTRAVENOUS at 21:40

## 2018-02-06 RX ADMIN — LORAZEPAM 0.5 MG: 2 INJECTION INTRAMUSCULAR; INTRAVENOUS at 22:29

## 2018-02-06 RX ADMIN — SODIUM CHLORIDE 1000 ML: 9 INJECTION, SOLUTION INTRAVENOUS at 21:42

## 2018-02-06 RX ADMIN — METHYLPREDNISOLONE SODIUM SUCCINATE 125 MG: 125 INJECTION, POWDER, FOR SOLUTION INTRAMUSCULAR; INTRAVENOUS at 21:40

## 2018-02-06 ASSESSMENT — LIFESTYLE VARIABLES: EVER_SMOKED: NEVER

## 2018-02-07 VITALS
RESPIRATION RATE: 16 BRPM | HEIGHT: 64 IN | DIASTOLIC BLOOD PRESSURE: 76 MMHG | HEART RATE: 107 BPM | OXYGEN SATURATION: 96 % | TEMPERATURE: 98.7 F | SYSTOLIC BLOOD PRESSURE: 126 MMHG | BODY MASS INDEX: 22.21 KG/M2 | WEIGHT: 130.07 LBS

## 2018-02-07 PROCEDURE — 36415 COLL VENOUS BLD VENIPUNCTURE: CPT

## 2018-02-07 PROCEDURE — 700101 HCHG RX REV CODE 250: Performed by: EMERGENCY MEDICINE

## 2018-02-07 RX ORDER — PREDNISONE 20 MG/1
20 TABLET ORAL DAILY
Qty: 5 TAB | Refills: 0 | Status: SHIPPED | OUTPATIENT
Start: 2018-02-07 | End: 2018-02-12

## 2018-02-07 RX ORDER — PREDNISONE 20 MG/1
20 TABLET ORAL DAILY
Qty: 5 TAB | Refills: 0 | Status: SHIPPED | OUTPATIENT
Start: 2018-02-07 | End: 2018-02-07

## 2018-02-07 RX ADMIN — IPRATROPIUM BROMIDE AND ALBUTEROL SULFATE 3 ML: .5; 3 SOLUTION RESPIRATORY (INHALATION) at 00:18

## 2018-02-07 ASSESSMENT — LIFESTYLE VARIABLES: DO YOU DRINK ALCOHOL: NO

## 2018-02-07 NOTE — ED TRIAGE NOTES
"Pt has hx of asthma. Pt symptoms started 1.5-2 hours ago. Pt is hunched over, with increased work of breathing. Pt took x2 puffs of  inhaler x5 different times, that did not help. Pt o2 sats at 93%, pt has bilateral wheezes. Charge RN notified, pt brought back to [facility] right away in wheelchair.     Blood pressure 119/77, pulse 84, temperature 37.1 °C (98.7 °F), resp. rate (!) 25, height 1.626 m (5' 4\"), weight 59 kg (130 lb 1.1 oz), SpO2 97 %.      "

## 2018-02-07 NOTE — ED PROVIDER NOTES
ED PROVIDER NOTE     Scribed for Pieter Donovan M.D. by Alyson Ortiz. 2/6/2018, 9:31 PM.    CHIEF COMPLAINT  Chief Complaint   Patient presents with   • Shortness of Breath   • Asthma     HPI    Primary care provider: JONO Lawler  Means of arrival: Walk in   History obtained from: Patient   History limited by: Critical Condition    Mita Jenkins is a 27 y.o. female with a a history of asthma who presents with severe shortness of breath onset today. The patient used her inhaler five times, two puffs at a time with no relief. Prior to today, patient was sick with cold like symptoms. She has a history of intubation secondary to pneumonia. She denies diarrhea, vomiting and dysuria. Her last menstrual period was last month. Patient has a history of eczema but denies any new rashes. She denies taking any daily medications.   HPI is limited by patient's critical condition.      REVIEW OF SYSTEMS  Constitutional: Negative for fever or chills.   HENT: Negative for nosebleeds or sore throat.    Eyes: Negative for vision changes or discharge.   Respiratory: Shortness of breath    Cardiovascular: Negative for chest pain or palpitations.   Gastrointestinal: Negative for vomiting and diarrhea.   Genitourinary: Negative for dysuria   Musculoskeletal: Negative for back pain or joint pain.   Skin: Eczema rash, no wounds.   Neurological: Negative for sensory or motor changes.   Endo/Heme/Allergies: Negative for weight changes or hives.   Psychiatric/Behavioral: Negative for depression or suicidal ideas.   ROS is limited secondary to patient's critical condition. C.     PAST MEDICAL HISTORY   Asthma     PAST FAMILY HISTORY  Family History   Problem Relation Age of Onset   • Heart Disease Mother    • Heart Attack Mother 40     presumed MI   • Heart Disease Sister      heart murmur or arrythmia   • Diabetes Maternal Grandfather    • Diabetes Paternal Grandmother      SOCIAL HISTORY  Social History     Social  "History Main Topics   • Smoking status: Never Smoker   • Smokeless tobacco: Never Used   • Alcohol use No   • Drug use: Unknown   •       SURGICAL HISTORY   Tonsillectomy, cholecystectomy, appendectomy     CURRENT MEDICATIONS  Home Medications    **Home medications have not yet been reviewed for this encounter**       ALLERGIES  Allergies   Allergen Reactions   • Codeine Vomiting       PHYSICAL EXAM  VITAL SIGNS: /77   Pulse 84   Temp 37.1 °C (98.7 °F)   Resp (!) 25   Ht 1.626 m (5' 4\")   Wt 59 kg (130 lb 1.1 oz)   SpO2 97%   BMI 22.33 kg/m²    Pulse ox interpretation: On room air, I interpret this pulse oximetry.   Constitutional: Well developed, well nourished. No acute distress.  HEENT: Normocephalic, atraumatic. Posterior pharynx clear, mucous membranes dry.   Eyes:  EOMI. Normal sclera.  Neck: Supple, Full range of motion, nontender.  Chest/Pulmonary: Inspiratory and expiratory wheezing in all lung fields. Tachypneic. Two word dyspnea.   Cardiovascular: Regular rate and rhythm, no murmur.   Abdomen: Soft, nontender, no rebound, guarding, or masses.  Back: No CVA tenderness, nontender midline, no step offs.  Musculoskeletal: No deformity, no edema.  Neuro: Clear speech, normal coordination, cranial nerves II-XII grossly intact.  Psych: Normal mood and affect.  Skin: Mild erthyema to the upper left extremity     DIAGNOSTIC STUDIES / PROCEDURES  LABS & EKG  Results for orders placed or performed during the hospital encounter of 02/06/18   CBC WITH DIFFERENTIAL   Result Value Ref Range    WBC 11.5 (H) 4.8 - 10.8 K/uL    RBC 5.27 4.20 - 5.40 M/uL    Hemoglobin 14.8 12.0 - 16.0 g/dL    Hematocrit 44.2 37.0 - 47.0 %    MCV 83.9 81.4 - 97.8 fL    MCH 28.1 27.0 - 33.0 pg    MCHC 33.5 (L) 33.6 - 35.0 g/dL    RDW 35.3 (L) 35.9 - 50.0 fL    Platelet Count 285 164 - 446 K/uL    MPV 11.9 9.0 - 12.9 fL    Neutrophils-Polys 53.00 44.00 - 72.00 %    Lymphocytes 33.80 22.00 - 41.00 %    Monocytes 6.70 0.00 - 13.40 % "    Eosinophils 5.40 0.00 - 6.90 %    Basophils 0.80 0.00 - 1.80 %    Immature Granulocytes 0.30 0.00 - 0.90 %    Nucleated RBC 0.00 /100 WBC    Neutrophils (Absolute) 6.07 2.00 - 7.15 K/uL    Lymphs (Absolute) 3.88 1.00 - 4.80 K/uL    Monos (Absolute) 0.77 0.00 - 0.85 K/uL    Eos (Absolute) 0.62 (H) 0.00 - 0.51 K/uL    Baso (Absolute) 0.09 0.00 - 0.12 K/uL    Immature Granulocytes (abs) 0.04 0.00 - 0.11 K/uL    NRBC (Absolute) 0.00 K/uL   TROPONIN   Result Value Ref Range    Troponin I <0.01 0.00 - 0.04 ng/mL   BNP   Result Value Ref Range    B Natriuretic Peptide 5 0 - 100 pg/mL   BMP   Result Value Ref Range    Sodium 139 135 - 145 mmol/L    Potassium 3.6 3.6 - 5.5 mmol/L    Chloride 105 96 - 112 mmol/L    Co2 23 20 - 33 mmol/L    Glucose 78 65 - 99 mg/dL    Bun 14 8 - 22 mg/dL    Creatinine 0.90 0.50 - 1.40 mg/dL    Calcium 9.4 8.5 - 10.5 mg/dL    Anion Gap 11.0 0.0 - 11.9   BETA-HCG QUALITATIVE SERUM   Result Value Ref Range    Beta-Hcg Qualitative Serum Negative Negative   ESTIMATED GFR   Result Value Ref Range    GFR If African American >60 >60 mL/min/1.73 m 2    GFR If Non African American >60 >60 mL/min/1.73 m 2     RADIOLOGY  DX-CHEST-PORTABLE (1 VIEW)   Final Result         1.  No acute cardiopulmonary disease.        COURSE & MEDICAL DECISION MAKING    This is a 27 y.o. female who presents with dyspnea c/w prior asthma exacerbations. Has been intubated in the past.     Differential Diagnosis includes but is not limited to:  Asthma exacerbation, Pneumonia, PTX, PE    ED Course:  9:35 PM Patient seen and examined at bedside. Ordered for DX chest, Beta-HCG Qualitative Serum, Influenza, CBC, Troponin, BNP, BMP, and other labs to evaluate. Patient will be treated with Ativan 0.5 mg, Duoneb 3 mL, Solu-Medrol 125 mg, NS 1000 mL, IVPB 2 g, and Duobeb 3 mL for her symptoms. She is also receiving IV fluids for dehydration and tachypnea with concern for dehydration.     I have personally reviewed the patient's chest  x-ray and my findings include:   -clear lungs, doubt pneumonia, pneumothorax, effusion, or volume overload  -cardiac silhouette within normal limits, doubt cardiomegaly  -bony landmarks without evidence of fracture(s)  -mediastinum without widening, doubt dissection    Labs reassuring, not pregnant, trop/bnp normal doubt massive PE. Lytes wnl. No e/o anemia.  Good response to 3 duoneb treatments, stable o2 sat after aggressive initial treatments with mag and IV fluids and parenteral steroids. Jittery after nebs, will give ativan for symptom control. The patient has had a positive response to IV fluids.  4th neb given since still wheezing on continued assessment.  Lung sounds improved, VSS. The patient and  are relieved at her vast improvement from her arrival condition. Presume possible mild URI as trigger but well appearing at present. Will prescribe steroids outpatient. Return immediately if any worse.     Upon my evaluation, this patient had a high probability of imminent or life-threatening deterioration due to acute respiratory failure secondary to status asthmaticus.     I personally provided 40 minutes of total critical care time outside of time spent on separately billable/documented procedures. This required my direct attention, intervention, and management which included the following:  -review of laboratory data  -review of radiology studies  -discussion with family and patient  -monitoring for potential decompensation  -parenteral fluids, magnesium, steroids, and 4 nebulizer treatments    Medications   ipratropium-albuterol (DUONEB) nebulizer solution 3 mL (3 mL Nebulization Given 2/6/18 2139)   ipratropium-albuterol (DUONEB) nebulizer solution 3 mL (3 mL Nebulization Given 2/6/18 2139)   methylPREDNISolone sod succ (SOLU-MEDROL) 125 MG injection 125 mg (125 mg Intravenous Given 2/6/18 2140)   NS infusion 1,000 mL (0 mL Intravenous Stopped 2/6/18 2230)   magnesium sulfate IVPB premix 2 g (0 g  Intravenous Stopped 2/6/18 2340)   LORazepam (ATIVAN) injection 0.5 mg (0.5 mg Intravenous Given 2/6/18 2229)     FINAL IMPRESSION  1. Asthma with status asthmaticus, unspecified asthma severity, unspecified whether persistent    2. Dehydration        PRESCRIPTIONS  Discharge Medication List as of 2/7/2018 12:53 AM      START taking these medications    Details   predniSONE (DELTASONE) 20 MG Tab Take 1 Tab by mouth every day for 5 days., Disp-5 Tab, R-0, Print Rx Paper             FOLLOW UP  JONO Lawler  [address] [place] [id number] [phone]    Schedule an appointment as soon as possible for a visit in 1 day      [facility], Emergency Dept  [address] [place] [id number] [phone]  Today  If symptoms worsen    -DISCHARGE-    The patient is referred to a primary physician for blood pressure management, diabetic screening, and for all other preventative health concerns.     Pertinent Labs & Imaging studies reviewed and verified by myself, as well as nursing notes and the patient's past medical, family, and social histories (See chart for details).    Results, exam findings, clinical impression, presumed diagnosis, treatment options, and strict return precautions were discussed with the patient and family, and they verbalized understanding, agreed with, and appreciated the plan of care.    Alyson FLANAGAN (Michelle), am scribing for, and in the presence of, Pieter Donovan M.D..    Electronically signed by: Alyson Jeong), 2/6/2018    Pieter FLANAGAN M.D. personally performed the services described in this documentation, as scribed by Alyson Ortiz in my presence, and it is both accurate and complete.    Portions of this record were made with voice recognition software.  Despite my review, spelling/grammar/context errors may still remain.  Interpretation of this chart should be taken in this context.    The note accurately  reflects work and decisions made by me.  Pieter Donovan  2/7/2018  1:56 PM

## 2018-02-07 NOTE — DISCHARGE INSTRUCTIONS
You were seen and evaluated in the Emergency Department at [facility] for:     Asthma exacerbation    You had the following tests and studies:    Chest x-ray showed no pneumonia, and your blood tests look great!    You received the following medications:    Duoneb treatments, albuterol, magnesium, solu-medrol, IV fluids    You received the following prescriptions:    Prednisone, take this for the next 5 days  ----------------------------    Please make sure to follow up with:    Your primary care doctor in 1-2 days for a recheck, but come right back for any new or worse symptoms.    Good luck, rest up the next 2 days, and feel better!  ----------------------------    We always encourage patients to return IMMEDIATELY if they have:  Increased or changing pain, passing out, fevers over 100.4 (taken in your mouth or rectally) for more than 2 days, redness or swelling of skin or tissues, feeling like your heart is beating fast, chest pain that is new or worsening, trouble breathing, feeling like your throat is closing up and can not breath, inability to walk, weakness of any part of your body, new dizziness, severe bleeding that won't stop from any part of your body, if you can't eat or drink, or if you have any other concerns.   If you feel worse, please know that you can always return with any questions, concerns, worse symptoms, or you are feeling unsafe. We certainly cannot say for sure that we have ruled out every illness or dangerous disease, but we feel that at this specific time, your exam, tests, and vital signs like heart rate and blood pressure are safe for discharge.         Asthma, Adult  Asthma is a recurring condition in which the airways tighten and narrow. Asthma can make it difficult to breathe. It can cause coughing, wheezing, and shortness of breath. Asthma episodes, also called asthma attacks, range from minor to life-threatening. Asthma cannot be cured, but medicines and lifestyle  changes can help control it.  CAUSES  Asthma is believed to be caused by inherited (genetic) and environmental factors, but its exact cause is unknown. Asthma may be triggered by allergens, lung infections, or irritants in the air. Asthma triggers are different for each person. Common triggers include:   · Animal dander.  · Dust mites.  · Cockroaches.  · Pollen from trees or grass.  · Mold.  · Smoke.  · Air pollutants such as dust, household , hair sprays, aerosol sprays, paint fumes, strong chemicals, or strong odors.  · Cold air, weather changes, and winds (which increase molds and pollens in the air).  · Strong emotional expressions such as crying or laughing hard.  · Stress.  · Certain medicines (such as aspirin) or types of drugs (such as beta-blockers).  · Sulfites in foods and drinks. Foods and drinks that may contain sulfites include dried fruit, potato chips, and sparkling grape juice.  · Infections or inflammatory conditions such as the flu, a cold, or an inflammation of the nasal membranes (rhinitis).  · Gastroesophageal reflux disease (GERD).  · Exercise or strenuous activity.  SYMPTOMS  Symptoms may occur immediately after asthma is triggered or many hours later. Symptoms include:  · Wheezing.  · Excessive nighttime or early morning coughing.  · Frequent or severe coughing with a common cold.  · Chest tightness.  · Shortness of breath.  DIAGNOSIS   The diagnosis of asthma is made by a review of your medical history and a physical exam. Tests may also be performed. These may include:  · Lung function studies. These tests show how much air you breathe in and out.  · Allergy tests.  · Imaging tests such as X-rays.  TREATMENT   Asthma cannot be cured, but it can usually be controlled. Treatment involves identifying and avoiding your asthma triggers. It also involves medicines. There are 2 classes of medicine used for asthma treatment:   · Controller medicines. These prevent asthma symptoms from  occurring. They are usually taken every day.  · Reliever or rescue medicines. These quickly relieve asthma symptoms. They are used as needed and provide short-term relief.  Your health care provider will help you create an asthma action plan. An asthma action plan is a written plan for managing and treating your asthma attacks. It includes a list of your asthma triggers and how they may be avoided. It also includes information on when medicines should be taken and when their dosage should be changed. An action plan may also involve the use of a device called a peak flow meter. A peak flow meter measures how well the lungs are working. It helps you monitor your condition.  HOME CARE INSTRUCTIONS   · Take medicines only as directed by your health care provider. Speak with your health care provider if you have questions about how or when to take the medicines.  · Use a peak flow meter as directed by your health care provider. Record and keep track of readings.  · Understand and use the action plan to help minimize or stop an asthma attack without needing to seek medical care.  · Control your home environment in the following ways to help prevent asthma attacks:  ¨ Do not smoke. Avoid being exposed to secondhand smoke.  ¨ Change your heating and air conditioning filter regularly.  ¨ Limit your use of fireplaces and wood stoves.  ¨ Get rid of pests (such as roaches and mice) and their droppings.  ¨ Throw away plants if you see mold on them.  ¨ Clean your floors and dust regularly. Use unscented cleaning products.  ¨ Try to have someone else vacuum for you regularly. Stay out of rooms while they are being vacuumed and for a short while afterward. If you vacuum, use a dust mask from a hardware store, a double-layered or microfilter vacuum  bag, or a vacuum  with a HEPA filter.  ¨ Replace carpet with wood, tile, or vinyl lorna. Carpet can trap dander and dust.  ¨ Use allergy-proof pillows, mattress covers,  and box spring covers.  ¨ Wash bed sheets and blankets every week in hot water and dry them in a dryer.  ¨ Use blankets that are made of polyester or cotton.  ¨ Clean bathrooms and [place] with bleach. If possible, have someone repaint the walls in these rooms with mold-resistant paint. Keep out of the rooms that are being cleaned and painted.  ¨ Wash hands frequently.  SEEK MEDICAL CARE IF:   · You have wheezing, shortness of breath, or a cough even if taking medicine to prevent attacks.  · The colored mucus you cough up (sputum) is thicker than usual.  · Your sputum changes from clear or white to yellow, green, gray, or bloody.  · You have any problems that may be related to the medicines you are taking (such as a rash, itching, swelling, or trouble breathing).  · You are using a reliever medicine more than 2-3 times per week.  · Your peak flow is still at 50-79% of your personal best after following your action plan for 1 hour.  · You have a fever.  SEEK IMMEDIATE MEDICAL CARE IF:   · You seem to be getting worse and are unresponsive to treatment during an asthma attack.  · You are short of breath even at rest.  · You get short of breath when doing very little physical activity.  · You have difficulty eating, drinking, or talking due to asthma symptoms.  · You develop chest pain.  · You develop a fast heartbeat.  · You have a bluish color to your lips or fingernails.  · You are light-headed, dizzy, or faint.  · Your peak flow is less than 50% of your personal best.  MAKE SURE YOU:   · Understand these instructions.  · Will watch your condition.  · Will get help right away if you are not doing well or get worse.     This information is not intended to replace advice given to you by your health care provider. Make sure you discuss any questions you have with your health care provider.     Document Released: 12/18/2006 Document Revised: 01/08/2016 Document Reviewed: 07/17/2014  [employer] Interactive Patient  Education ©2016 [employer]

## 2018-02-18 ENCOUNTER — OFFICE VISIT (OUTPATIENT)
Dept: URGENT CARE | Facility: PHYSICIAN GROUP | Age: 28
End: 2018-02-18
Payer: COMMERCIAL

## 2018-02-18 VITALS
BODY MASS INDEX: 21.66 KG/M2 | HEART RATE: 82 BPM | OXYGEN SATURATION: 100 % | TEMPERATURE: 98.8 F | DIASTOLIC BLOOD PRESSURE: 68 MMHG | HEIGHT: 65 IN | SYSTOLIC BLOOD PRESSURE: 112 MMHG | WEIGHT: 130 LBS

## 2018-02-18 DIAGNOSIS — R10.32 ABDOMINAL PAIN, LEFT LOWER QUADRANT: ICD-10-CM

## 2018-02-18 DIAGNOSIS — O21.9 NAUSEA AND VOMITING DURING PREGNANCY: ICD-10-CM

## 2018-02-18 DIAGNOSIS — Z32.01 PREGNANCY TEST POSITIVE: Primary | ICD-10-CM

## 2018-02-18 LAB
APPEARANCE UR: ABNORMAL
BILIRUB UR STRIP-MCNC: NEGATIVE MG/DL
COLOR UR AUTO: YELLOW
GLUCOSE UR STRIP.AUTO-MCNC: NEGATIVE MG/DL
INT CON NEG: ABNORMAL
INT CON POS: ABNORMAL
KETONES UR STRIP.AUTO-MCNC: NEGATIVE MG/DL
LEUKOCYTE ESTERASE UR QL STRIP.AUTO: ABNORMAL
NITRITE UR QL STRIP.AUTO: NEGATIVE
PH UR STRIP.AUTO: 6 [PH] (ref 5–8)
POC URINE PREGNANCY TEST: POSITIVE
PROT UR QL STRIP: NEGATIVE MG/DL
RBC UR QL AUTO: NEGATIVE
SP GR UR STRIP.AUTO: 1.03
UROBILINOGEN UR STRIP-MCNC: NEGATIVE MG/DL

## 2018-02-18 PROCEDURE — 99214 OFFICE O/P EST MOD 30 MIN: CPT | Performed by: PHYSICIAN ASSISTANT

## 2018-02-18 PROCEDURE — 81025 URINE PREGNANCY TEST: CPT | Performed by: PHYSICIAN ASSISTANT

## 2018-02-18 PROCEDURE — 81002 URINALYSIS NONAUTO W/O SCOPE: CPT | Performed by: PHYSICIAN ASSISTANT

## 2018-02-18 RX ORDER — ONDANSETRON 4 MG/1
4 TABLET, ORALLY DISINTEGRATING ORAL ONCE
Status: COMPLETED | OUTPATIENT
Start: 2018-02-18 | End: 2018-02-18

## 2018-02-18 RX ORDER — ONDANSETRON 4 MG/1
4 TABLET, ORALLY DISINTEGRATING ORAL EVERY 6 HOURS PRN
Qty: 20 TAB | Refills: 0 | Status: SHIPPED | OUTPATIENT
Start: 2018-02-18 | End: 2019-01-04

## 2018-02-18 RX ADMIN — ONDANSETRON 4 MG: 4 TABLET, ORALLY DISINTEGRATING ORAL at 17:46

## 2018-02-18 ASSESSMENT — ENCOUNTER SYMPTOMS
HEADACHES: 1
ABDOMINAL PAIN: 1
VOMITING: 1
DIARRHEA: 0
NAUSEA: 1
CHILLS: 0
FEVER: 0

## 2018-02-19 NOTE — PROGRESS NOTES
Subjective:      Mita Jenkins is a 27 y.o. female who presents with Abdominal Pain (Cramping feeling, earlier today felt a tearing sensation has had pain since then. Tested + for pregnancy earlier this week )    PMH:  Reviewed with patient/family member/EPIC.   MEDS:   Current Outpatient Prescriptions:   •  ondansetron (ZOFRAN ODT) 4 MG TABLET DISPERSIBLE, Take 1 Tab by mouth every 6 hours as needed for Nausea., Disp: 20 Tab, Rfl: 0  •  ibuprofen (MOTRIN) 800 MG Tab, Take 1 Tab by mouth every 8 hours as needed., Disp: 60 Tab, Rfl: 1  •  oxycodone-acetaminophen (PERCOCET) 5-325 MG Tab, Take 1 Tab by mouth every four hours as needed., Disp: 5 Tab, Rfl: 0  •  VENTOLIN  (90 BASE) MCG/ACT Aero Soln inhalation aerosol, INHALE 2 PUFFS PO  QID, Disp: , Rfl: 1  •  Promethazine HCl (PHENERGAN PO), Take  by mouth., Disp: , Rfl:   •  oseltamivir (TAMIFLU) 75 MG Cap, Take 1 Cap by mouth 2 times a day., Disp: 10 Cap, Rfl: 0  •  cyclobenzaprine (FLEXERIL) 10 MG Tab, Take 1 Tab by mouth 3 times a day as needed., Disp: 30 Tab, Rfl: 0  •  ibuprofen (MOTRIN) 200 MG Tab, Take 200 mg by mouth every 6 hours as needed., Disp: , Rfl:   •  Norgestim-Eth Estrad Triphasic 0.18/0.215/0.25 MG-25 MCG Tab, Take  by mouth., Disp: , Rfl:   •  citalopram (CELEXA) 20 MG Tab, Take 20 mg by mouth every day., Disp: , Rfl:   •  fluconazole (DIFLUCAN) 150 MG tablet, Take 1 Tab by mouth every day., Disp: 1 Tab, Rfl: 1  •  methylPREDNISolone (MEDROL, BREANN,) 4 MG tablet, On day one take 8 mg PO before breakfast and HS, and 4 mg PO after lunch and after dinner. On day two take 4 mg PO 3 times a day and 8 mg PO at bedtime. On day 3 take 4 mg PO 4 times a day. On day 4 take 4 mg PO 3 times a day. On day 5 take 4 mg PO twice a day.On day 6 take 4 mg PO daily, Disp: 1 Kit, Rfl: 0  •  ibuprofen (MOTRIN) 600 MG Tab, Take 1 Tab by mouth every 6 hours as needed., Disp: 30 Tab, Rfl: 0    Current Facility-Administered Medications:   •  ondansetron (ZOFRAN ODT)  dispertab 4 mg, 4 mg, Oral, Once, Kinsey Mix, P.A.-C.  ALLERGIES:   Allergies   Allergen Reactions   • Codeine Vomiting     SURGHX:   Past Surgical History:   Procedure Laterality Date   • OTHER      tonsillectomy, appendectomy, cholecystectomy     SOCHX:  reports that she has never smoked. She has never used smokeless tobacco. She reports that she does not drink alcohol.  FH: family history includes Diabetes in her maternal grandfather and paternal grandmother; Heart Attack (age of onset: 40) in her mother; Heart Disease in her mother and sister. Reviewed with patient/family. Not pertinent to this complaint.             Patient presents to clinic today with the complaint of left lower abdominal pain that is crampy in nature, and is worse with certain movements and positions. Patient is a  and carries a heavy rifle on her right shoulder. Patient states she moved rifle position, which caused a twinge in her left lower abdomen. Patient states since then she has had crampy abdominal pain on the left.    Patient is pregnant, she took a pregnancy test at home 10 days ago and it was positive. Patient's last menstrual period was 12/29/2018. Patient is Rh-.    Patient has a history of multiple miscarriages.    Patient denies vaginal bleeding.         LLQ Pain   This is a new problem. The current episode started today. The onset quality is sudden. The problem has been waxing and waning. The pain is located in the LLQ. The pain is moderate. The quality of the pain is cramping. The abdominal pain does not radiate. Associated symptoms include headaches, nausea and vomiting. Pertinent negatives include no diarrhea or fever. The pain is aggravated by movement, palpation and certain positions. The pain is relieved by certain positions. She has tried nothing for the symptoms.       Review of Systems   Constitutional: Negative for chills and fever.   Gastrointestinal: Positive for abdominal pain, nausea and  "vomiting. Negative for diarrhea.   Genitourinary: Negative.    Neurological: Positive for headaches.   All other systems reviewed and are negative.         Objective:     /68   Pulse 82   Temp 37.1 °C (98.8 °F)   Ht 1.651 m (5' 5\")   Wt 59 kg (130 lb)   LMP 12/29/2017   SpO2 100%   BMI 21.63 kg/m²      Physical Exam   Constitutional: She is oriented to person, place, and time. She appears well-developed and well-nourished. No distress.   HENT:   Head: Normocephalic and atraumatic.   Nose: Nose normal.   Eyes: Conjunctivae and EOM are normal. Pupils are equal, round, and reactive to light.   Neck: Normal range of motion. Neck supple.   Cardiovascular: Normal rate, regular rhythm and normal heart sounds.    Pulmonary/Chest: Effort normal and breath sounds normal.   Abdominal: Soft.   Genitourinary:   Genitourinary Comments: Declined exam     Musculoskeletal: Normal range of motion.   Neurological: She is alert and oriented to person, place, and time. Gait normal.   Skin: Skin is warm and dry. Capillary refill takes less than 2 seconds.   Psychiatric: She has a normal mood and affect.   Nursing note and vitals reviewed.         Urine Pregnancy: positive     Assessment/Plan:     1. Pregnancy test positive  ondansetron (ZOFRAN ODT) dispertab 4 mg    ondansetron (ZOFRAN ODT) 4 MG TABLET DISPERSIBLE   2. Nausea and vomiting during pregnancy  ondansetron (ZOFRAN ODT) dispertab 4 mg    ondansetron (ZOFRAN ODT) 4 MG TABLET DISPERSIBLE   3. Abdominal pain, left lower quadrant  ondansetron (ZOFRAN ODT) dispertab 4 mg    ondansetron (ZOFRAN ODT) 4 MG TABLET DISPERSIBLE    POCT Urinalysis    POCT PREGNANCY     PT requires evaluation and treatment at a facility that can provide a higher level of care due to acuity of illness/complaint.     PT will go to ER by POV, declined [facility] transport.     PT states she will be going to [facility] ER.  "

## 2019-01-04 ENCOUNTER — OFFICE VISIT (OUTPATIENT)
Dept: MEDICAL GROUP | Facility: MEDICAL CENTER | Age: 29
End: 2019-01-04
Attending: INTERNAL MEDICINE
Payer: MEDICAID

## 2019-01-04 VITALS
DIASTOLIC BLOOD PRESSURE: 78 MMHG | RESPIRATION RATE: 16 BRPM | WEIGHT: 133 LBS | HEART RATE: 88 BPM | OXYGEN SATURATION: 96 % | SYSTOLIC BLOOD PRESSURE: 118 MMHG | TEMPERATURE: 98.1 F | HEIGHT: 65 IN | BODY MASS INDEX: 22.16 KG/M2

## 2019-01-04 DIAGNOSIS — R00.2 PALPITATIONS: ICD-10-CM

## 2019-01-04 DIAGNOSIS — J45.20 MILD INTERMITTENT ASTHMA WITHOUT COMPLICATION: ICD-10-CM

## 2019-01-04 DIAGNOSIS — Z01.818 TUBAL LIGATION EVALUATION: ICD-10-CM

## 2019-01-04 DIAGNOSIS — Z30.011 INITIATION OF OCP (BCP): ICD-10-CM

## 2019-01-04 DIAGNOSIS — F41.0 SEVERE ANXIETY WITH PANIC: ICD-10-CM

## 2019-01-04 DIAGNOSIS — K92.1 MELENA: ICD-10-CM

## 2019-01-04 DIAGNOSIS — R07.9 CHEST PAIN, UNSPECIFIED TYPE: ICD-10-CM

## 2019-01-04 DIAGNOSIS — R10.84 GENERALIZED ABDOMINAL PAIN: ICD-10-CM

## 2019-01-04 PROCEDURE — 99213 OFFICE O/P EST LOW 20 MIN: CPT | Performed by: INTERNAL MEDICINE

## 2019-01-04 PROCEDURE — 99204 OFFICE O/P NEW MOD 45 MIN: CPT | Performed by: INTERNAL MEDICINE

## 2019-01-04 RX ORDER — ALBUTEROL SULFATE 90 UG/1
AEROSOL, METERED RESPIRATORY (INHALATION)
Qty: 8.5 G | Refills: 1 | Status: SHIPPED | OUTPATIENT
Start: 2019-01-04 | End: 2019-07-22 | Stop reason: SDUPTHER

## 2019-01-04 RX ORDER — ACETAMINOPHEN AND CODEINE PHOSPHATE 120; 12 MG/5ML; MG/5ML
1 SOLUTION ORAL DAILY
Qty: 28 TAB | Refills: 3 | Status: SHIPPED | OUTPATIENT
Start: 2019-01-04 | End: 2019-07-29 | Stop reason: SDUPTHER

## 2019-01-04 RX ORDER — ONDANSETRON 4 MG/1
4 TABLET, ORALLY DISINTEGRATING ORAL EVERY 6 HOURS PRN
Qty: 10 TAB | Refills: 3 | Status: SHIPPED | OUTPATIENT
Start: 2019-01-04 | End: 2019-10-15 | Stop reason: SDUPTHER

## 2019-01-04 RX ORDER — SUCRALFATE 1 G/1
1 TABLET ORAL
Qty: 60 TAB | Refills: 0 | Status: SHIPPED | OUTPATIENT
Start: 2019-01-04 | End: 2019-07-22

## 2019-01-04 RX ORDER — RANITIDINE 300 MG/1
300 TABLET ORAL DAILY
Qty: 30 TAB | Refills: 0 | Status: SHIPPED | OUTPATIENT
Start: 2019-01-04 | End: 2019-11-13

## 2019-01-04 ASSESSMENT — PAIN SCALES - GENERAL: PAINLEVEL: 6=MODERATE PAIN

## 2019-01-04 NOTE — ASSESSMENT & PLAN NOTE
"She reports that since she had her baby  11 weeks ago, she has had severe abdominal pain.  She cannot localize it to one specific area.  feels like \"someone is reaching in and twisting everything.\"  States the pain is very sharp.  Worse when she has anything to eat or drink including water so states that she has essentially stopped eating.  Last meal was 2 days ago. Reports daily black bowel movements and associated nausea which also hinders her appetite.  She is currently breastfeeding and worried about her milk production given her low food intake.  Denies diarrhea, constipation, hematochezia, dysuria.  She has had both her galbaldder and appendix removed.  She has not sought help in the ER due to her anxiety.  "

## 2019-01-04 NOTE — ASSESSMENT & PLAN NOTE
Patient reports intermittent bouts of chest pain for the past few years.  She states that it is becoming more frequent over the past few months and is now occurring daily.  She feels the pain just below both collarbones stretching across her chest.  Sometimes she will feel it on the sides of her breasts as well.  She states it feels like a weight is across her shoulders and the only way to relieve the pain is to lean forward and roll her shoulders inward.  The pain usually lasts anywhere from 3-5 minutes and resolves spontaneously.  It has never lasted longer than 10 min.  She does experience some shortness of breath and palpitations at the time.  She reports that the pain often triggers panic attacks, but she does not think that anxiety causes the pain. It may come on when she is driving, at rest, breastfeeding, or when she is active.  She last felt it at 2:45 this morning and again around 8:00 am.  She denies cough, fever/chills, recent URI.  she has tried tylenol, stretching, massage, and chiropractor for the pain without improvement.  There was no change in the character or nature of the pain during her recent pregnancy.  She has been evaluated in the ER for this pain with a negative cardiac work up.  Has also seen a cardiologist who recommended a holter monitor study but she did not have this done as it was not covered by her insurance.

## 2019-01-06 PROBLEM — J45.20 MILD INTERMITTENT ASTHMA WITHOUT COMPLICATION: Status: ACTIVE | Noted: 2019-01-06

## 2019-01-07 NOTE — ASSESSMENT & PLAN NOTE
She currently uses a ventolin inhaler but reports being on singulair and advair in the past.  Using the inhaler daily mostly in conjunction with panic attacks.

## 2019-01-07 NOTE — ASSESSMENT & PLAN NOTE
She reports worsening anxiety since the birth of her child about 2 weeks ago.  States it is very hard for her to go out in to public or be in crowds without having a panic attack.  Has taken xanax in the past for this but stopped all meds when she became pregnant.

## 2019-01-07 NOTE — ASSESSMENT & PLAN NOTE
She reports racing heart whenever she has the chest pain as described above.  Her sister has an arrhythmia but she is not sure which type.  She denies alcohol and illicit drug use.  She also gets a racing heart with her panic attacks as well as shortness of breath which is relieved by her inhaler.

## 2019-01-07 NOTE — PROGRESS NOTES
"Mita Vaca is a 28 y.o. female here for severe abdominal pain, chest pain, anxiety with panic attacks, issues as below, establish care  HPI:    Chest pain  Patient reports intermittent bouts of chest pain for the past few years.  She states that it is becoming more frequent over the past few months and is now occurring daily.  She feels the pain just below both collarbones stretching across her chest.  Sometimes she will feel it on the sides of her breasts as well.  She states it feels like a weight is across her shoulders and the only way to relieve the pain is to lean forward and roll her shoulders inward.  The pain usually lasts anywhere from 3-5 minutes and resolves spontaneously.  It has never lasted longer than 10 min.  She does experience some shortness of breath and palpitations at the time.  She reports that the pain often triggers panic attacks, but she does not think that anxiety causes the pain. It may come on when she is driving, at rest, breastfeeding, or when she is active.  She last felt it at 2:45 this morning and again around 8:00 am.  She denies cough, fever/chills, recent URI.  she has tried tylenol, stretching, massage, and chiropractor for the pain without improvement.  There was no change in the character or nature of the pain during her recent pregnancy.  She has been evaluated in the ER for this pain with a negative cardiac work up.  Has also seen a cardiologist who recommended a holter monitor study but she did not have this done as it was not covered by her insurance.    Generalized abdominal pain  She reports that since she had her baby  11 weeks ago, she has had severe abdominal pain.  She cannot localize it to one specific area.  feels like \"someone is reaching in and twisting everything.\"  States the pain is very sharp.  Worse when she has anything to eat or drink including water so states that she has essentially stopped eating.  Last meal was 2 days ago. Reports daily black bowel " movements and associated nausea which also hinders her appetite.  She is currently breastfeeding and worried about her milk production given her low food intake.  Denies diarrhea, constipation, hematochezia, dysuria.  She has had both her galbaldder and appendix removed.  She has not sought help in the ER due to her anxiety.  Reports severe GERD during her pregnancy but denies currently.  States she was taking TUMS daily while pregnant but has not taken anything besides tylenol for the pain lately due to not knowing what was safe in breastfeeding.    Initiation of OCP (BCP)  She desires to start on birth control and is still breast feeding her 11 week old infant.  Ultimately she would like a tubal ligation for definitive birth control and is requesting a referral to gyn.    Severe anxiety with panic  She reports worsening anxiety since the birth of her child about 2 weeks ago.  States it is very hard for her to go out in to public or be in crowds without having a panic attack.  Has taken xanax in the past for this but stopped all meds when she became pregnant.    Palpitations  She reports racing heart whenever she has the chest pain as described above.  Her sister has an arrhythmia but she is not sure which type.  She denies alcohol and illicit drug use.  She also gets a racing heart with her panic attacks as well as shortness of breath which is relieved by her inhaler.    Mild intermittent asthma without complication  She currently uses a ventolin inhaler but reports being on singulair and advair in the past.  Using the inhaler daily mostly in conjunction with panic attacks.    Current medicines (including changes today)  Current Outpatient Prescriptions   Medication Sig Dispense Refill   • ondansetron (ZOFRAN ODT) 4 MG TABLET DISPERSIBLE Take 1 Tab by mouth every 6 hours as needed for Nausea. 10 Tab 3   • sucralfate (CARAFATE) 1 GM Tab Take 1 Tab by mouth 3 times a day before meals. 60 Tab 0   • ranitidine  "(ZANTAC) 300 MG tablet Take 1 Tab by mouth every day. 30 Tab 0   • VENTOLIN  (90 Base) MCG/ACT Aero Soln inhalation aerosol INHALE 2 PUFFS PO  QID 8.5 g 1   • norethindrone (MICRONOR) 0.35 MG tablet Take 1 Tab by mouth every day. 28 Tab 3   • ibuprofen (MOTRIN) 200 MG Tab Take 200 mg by mouth every 6 hours as needed.       No current facility-administered medications for this visit.      She  has a past medical history of Anxiety; Asthma; and GERD (gastroesophageal reflux disease).  She  has a past surgical history that includes cholecystectomy; appendectomy; and tonsillectomy.  Social History   Substance Use Topics   • Smoking status: Never Smoker   • Smokeless tobacco: Never Used   • Alcohol use No     Social History     Social History Narrative   • No narrative on file     Family History   Problem Relation Age of Onset   • Heart Disease Mother    • Heart Attack Mother 40        presumed MI   • Alcohol/Drug Mother    • Heart Disease Sister         heart murmur or arrythmia   • Diabetes Maternal Grandfather    • Cancer Maternal Grandfather         colon   • Alcohol/Drug Father    • Psychiatry Maternal Aunt         bipolar, suicide   • Psychiatry Maternal Uncle    • Diabetes Maternal Grandmother    • Cancer Maternal Grandmother 79        breast         ROS  As above in HPI  All other systems reviewed and are negative     Objective:     Blood pressure 118/78, pulse 88, temperature 36.7 °C (98.1 °F), temperature source Temporal, resp. rate 16, height 1.651 m (5' 5\"), weight 60.3 kg (133 lb), SpO2 96 %, not currently breastfeeding. Body mass index is 22.13 kg/m².  Physical Exam:    Constitutional: Alert, no distress, non toxic appearing.  Skin: Warm, dry, good turgor, no rashes in visible areas.  Eye: Equal, round and reactive, conjunctiva clear, lids normal.  ENMT: Lips without lesions, good dentition, oropharynx clear, TM's clear bilaterally.  Neck: Trachea midline, no masses, no thyromegaly. No cervical or " supraclavicular lymphadenopathy.  Respiratory: Unlabored respiratory effort, lungs clear to auscultation, no wheezes, no ronchi.  Cardiovascular: Regular rate and rhythm, no murmurs appreciated, no lower extremity edema.  Abdomen: Soft, tenderness to palpation in epigastric region and LLQ without rebound or guarding, no masses, no hepatosplenomegaly.  Psych: Alert and oriented x3, normal affect and mood.        Assessment and Plan:   The following treatment plan was discussed    1. Generalized abdominal pain  Differential includes PUD, gastritis, pancreatitis, H. Pylori infection, less likely cholecystitis given s/p cholecystectomy, diverticulitis, unlikely SBO or perforation given benign exam, non toxic appearing, normal vitals.  Discussed urgent referral to GI, labs to be obtained today or tomorrow, trial of GI cocktail with ranitidine and carafate.  ER precautions given for worsening symptoms.  - CBC WITH DIFFERENTIAL; Future  - COMP METABOLIC PANEL; Future  - LIPASE; Future  - hyoscyamine-maalox plus-lidocaine viscous (GI COCKTAIL); Take 30 mL by mouth Once for 1 dose.  Dispense: 30 mL; Refill: 0  - REFERRAL TO GASTROENTEROLOGY  - ondansetron (ZOFRAN ODT) 4 MG TABLET DISPERSIBLE; Take 1 Tab by mouth every 6 hours as needed for Nausea.  Dispense: 10 Tab; Refill: 3  - sucralfate (CARAFATE) 1 GM Tab; Take 1 Tab by mouth 3 times a day before meals.  Dispense: 60 Tab; Refill: 0  - ranitidine (ZANTAC) 300 MG tablet; Take 1 Tab by mouth every day.  Dispense: 30 Tab; Refill: 0  -f/u 2 weeks    2. Melena  See discussion above, will obtain CBC immediately.  Discussed GI will likely do an EGD, urgent referral placed.  - REFERRAL TO GASTROENTEROLOGY    3. Tubal ligation evaluation  - REFERRAL TO GYNECOLOGY    4. Chest pain, unspecified type  Unclear etiology.  ACS less likely given age, lack of risk factors, previous normal cardiac work ups.  Will obtain holter as previously suggested by cardiology.  No exam findings to  suggest pneumonia or pulmonary etiology.  May represent esophageal spasm, severe GERD, anxiety related.  When abdominal pain is diagnosed and better controlled, chest pain may also improve.  - HOLTER - Cardiology Performed (24HR); Future    5. Severe anxiety with panic  Did not have time to address today.  Discussed avoiding xanax given breast feeding.  Would consider PRN hydroxyzine vs zoloft  -discuss at follow up in 2 weeks    6. Palpitations  - HOLTER - Cardiology Performed (24HR); Future    7. Initiation of OCP (BCP)  - norethindrone (MICRONOR) 0.35 MG tablet; Take 1 Tab by mouth every day.  Dispense: 28 Tab; Refill: 3    8. Mild intermittent asthma without complication  - VENTOLIN  (90 Base) MCG/ACT Aero Soln inhalation aerosol; INHALE 2 PUFFS PO  QID  Dispense: 8.5 g; Refill: 1  -discuss symptoms, consider starting previous meds (singular and advair) if needed at next visit        Followup: Return in about 2 weeks (around 1/18/2019), or if symptoms worsen or fail to improve, for labs, pain, anxiety.

## 2019-01-07 NOTE — ASSESSMENT & PLAN NOTE
She desires to start on birth control and is still breast feeding her 11 week old infant.  Ultimately she would like a tubal ligation for definitive birth control and is requesting a referral to gyn.

## 2019-01-18 ENCOUNTER — OFFICE VISIT (OUTPATIENT)
Dept: MEDICAL GROUP | Facility: MEDICAL CENTER | Age: 29
End: 2019-01-18
Attending: INTERNAL MEDICINE
Payer: MEDICAID

## 2019-01-18 VITALS
OXYGEN SATURATION: 95 % | SYSTOLIC BLOOD PRESSURE: 100 MMHG | RESPIRATION RATE: 16 BRPM | HEIGHT: 65 IN | HEART RATE: 68 BPM | DIASTOLIC BLOOD PRESSURE: 60 MMHG | TEMPERATURE: 97.9 F | BODY MASS INDEX: 21.99 KG/M2 | WEIGHT: 132 LBS

## 2019-01-18 DIAGNOSIS — F41.0 SEVERE ANXIETY WITH PANIC: ICD-10-CM

## 2019-01-18 DIAGNOSIS — Z30.011 INITIATION OF OCP (BCP): ICD-10-CM

## 2019-01-18 DIAGNOSIS — R10.84 GENERALIZED ABDOMINAL PAIN: ICD-10-CM

## 2019-01-18 DIAGNOSIS — R07.9 CHEST PAIN, UNSPECIFIED TYPE: ICD-10-CM

## 2019-01-18 PROCEDURE — 99214 OFFICE O/P EST MOD 30 MIN: CPT | Performed by: INTERNAL MEDICINE

## 2019-01-18 PROCEDURE — 99212 OFFICE O/P EST SF 10 MIN: CPT | Performed by: INTERNAL MEDICINE

## 2019-01-18 NOTE — ASSESSMENT & PLAN NOTE
She continues to breast-feed.  We started her on Micronor at her last visit and she states that several days later she began her menstrual cycle.  She has not stopped since.  This is the first cycle she has had since giving birth 13 weeks ago.

## 2019-01-18 NOTE — ASSESSMENT & PLAN NOTE
She states that the chest pain has been less severe lately.  She has been called about picking up the Holter monitor but has not been able to get it yet.

## 2019-01-18 NOTE — PROGRESS NOTES
Subjective:   Mita Vaca is a 28 y.o. female here today for follow-up abdominal pain, blood work, chronic medical issues as below    Chest pain  She states that the chest pain has been less severe lately.  She has been called about picking up the Holter monitor but has not been able to get it yet.    Generalized abdominal pain  She continues to report severe daily abdominal pain after eating.  She reports that it has gotten better after drinking water or juice.  She has been taking the Carafate 3 times a day as well as the ranitidine 300 mg daily.  She tried the GI cocktail after her last visit with me and states that it caused her throat to feel numb but did not really help with her stomach.  She ate a bowl of soup following the GI cocktail and had diarrhea and abdominal pain with dark black stool.  She does think that the Carafate and the ranitidine have allowed her to tolerate liquids a little bit better.  She has an appointment with GI next week which was the sooner she can get in.    Initiation of OCP (BCP)  She continues to breast-feed.  We started her on Micronor at her last visit and she states that several days later she began her menstrual cycle.  She has not stopped since.  This is the first cycle she has had since giving birth 13 weeks ago.    Severe anxiety with panic  She reports that anxiety has been under control as long as she is not in a large crowd.  She did have a panic attack recently when she had to go to [employer].  She was able to calm herself down with breathing exercises.  She is not currently taking any anxiolytic medication.       Current medicines (including changes today)  Current Outpatient Prescriptions   Medication Sig Dispense Refill   • sucralfate (CARAFATE) 1 GM Tab Take 1 Tab by mouth 3 times a day before meals. 60 Tab 0   • ranitidine (ZANTAC) 300 MG tablet Take 1 Tab by mouth every day. 30 Tab 0   • VENTOLIN  (90 Base) MCG/ACT Aero Soln inhalation aerosol INHALE 2 PUFFS  "PO  QID 8.5 g 1   • norethindrone (MICRONOR) 0.35 MG tablet Take 1 Tab by mouth every day. 28 Tab 3   • ondansetron (ZOFRAN ODT) 4 MG TABLET DISPERSIBLE Take 1 Tab by mouth every 6 hours as needed for Nausea. 10 Tab 3     No current facility-administered medications for this visit.      She  has a past medical history of Anxiety; Asthma; and GERD (gastroesophageal reflux disease).    ROS   As above in HPI     Objective:     Blood pressure 100/60, pulse 68, temperature 36.6 °C (97.9 °F), temperature source Temporal, resp. rate 16, height 1.651 m (5' 5\"), weight 59.9 kg (132 lb), SpO2 95 %, not currently breastfeeding. Body mass index is 21.97 kg/m².   Physical Exam:  Constitutional: Alert, no distress.  Skin: Warm, dry, good turgor, no rashes in visible areas.  Eye: Equal, round and reactive, conjunctiva clear, lids normal.  Psych: Alert and oriented x3, normal affect and mood.      Results and Imaging:   See scanned media for quest labs  CBC, CMP, lipase all within normal limits    Assessment and Plan:   The following treatment plan was discussed    1. Chest pain, unspecified type  Has become less severe.  She will schedule for her Holter monitor as was previously recommended by cardiology.  -Follow-up Holter monitor result    2. Generalized abdominal pain  Still persists with what sounds like daily melanotic bowel movements.  She was not anemic on her blood work.  She will follow-up with GI and continue current medications for now.  -Follow-up with GI next week, will likely need an endoscopy  -Carafate 3 times daily, ranitidine daily    3. Initiation of OCP (BCP)  We discussed stopping the Micronor until her period stops.  After that she can resume it.  She will let me know if it seems to be causing increased bleeding.    4. Severe anxiety with panic  Stable as long as she avoids crowds.  We will continue to monitor.        Followup: Return in about 6 weeks (around 3/1/2019), or if symptoms worsen or fail to " improve, for abdominal pain, GI recs.

## 2019-01-18 NOTE — ASSESSMENT & PLAN NOTE
She reports that anxiety has been under control as long as she is not in a large crowd.  She did have a panic attack recently when she had to go to [employer].  She was able to calm herself down with breathing exercises.  She is not currently taking any anxiolytic medication.

## 2019-01-18 NOTE — ASSESSMENT & PLAN NOTE
She continues to report severe daily abdominal pain after eating.  She reports that it has gotten better after drinking water or juice.  She has been taking the Carafate 3 times a day as well as the ranitidine 300 mg daily.  She tried the GI cocktail after her last visit with me and states that it caused her throat to feel numb but did not really help with her stomach.  She ate a bowl of soup following the GI cocktail and had diarrhea and abdominal pain with dark black stool.  She does think that the Carafate and the ranitidine have allowed her to tolerate liquids a little bit better.  She has an appointment with GI next week which was the sooner she can get in.

## 2019-02-12 PROBLEM — K44.9 HIATAL HERNIA: Status: ACTIVE | Noted: 2019-02-12

## 2019-02-12 PROBLEM — K29.60 EROSIVE GASTRITIS: Status: ACTIVE | Noted: 2019-02-12

## 2019-02-12 PROBLEM — K20.80 ESOPHAGITIS, LOS ANGELES GRADE A: Status: ACTIVE | Noted: 2019-02-12

## 2019-07-22 ENCOUNTER — OFFICE VISIT (OUTPATIENT)
Dept: MEDICAL GROUP | Facility: MEDICAL CENTER | Age: 29
End: 2019-07-22
Payer: COMMERCIAL

## 2019-07-22 VITALS
SYSTOLIC BLOOD PRESSURE: 92 MMHG | OXYGEN SATURATION: 95 % | WEIGHT: 117.5 LBS | DIASTOLIC BLOOD PRESSURE: 68 MMHG | BODY MASS INDEX: 19.58 KG/M2 | HEIGHT: 65 IN | HEART RATE: 71 BPM | TEMPERATURE: 98.2 F

## 2019-07-22 DIAGNOSIS — R00.2 PALPITATIONS: ICD-10-CM

## 2019-07-22 DIAGNOSIS — J45.20 MILD INTERMITTENT ASTHMA WITHOUT COMPLICATION: ICD-10-CM

## 2019-07-22 DIAGNOSIS — R07.9 CHEST PAIN, UNSPECIFIED TYPE: ICD-10-CM

## 2019-07-22 DIAGNOSIS — R10.84 GENERALIZED ABDOMINAL PAIN: ICD-10-CM

## 2019-07-22 PROCEDURE — 99214 OFFICE O/P EST MOD 30 MIN: CPT | Performed by: FAMILY MEDICINE

## 2019-07-22 RX ORDER — MONTELUKAST SODIUM 10 MG/1
10 TABLET ORAL DAILY
Qty: 30 TAB | Refills: 5 | Status: SHIPPED | OUTPATIENT
Start: 2019-07-22 | End: 2019-11-25 | Stop reason: SDUPTHER

## 2019-07-22 RX ORDER — ALBUTEROL SULFATE 90 UG/1
AEROSOL, METERED RESPIRATORY (INHALATION)
Qty: 8.5 G | Refills: 1 | Status: SHIPPED | OUTPATIENT
Start: 2019-07-22 | End: 2020-07-22

## 2019-07-22 ASSESSMENT — PATIENT HEALTH QUESTIONNAIRE - PHQ9
5. POOR APPETITE OR OVEREATING: 0 - NOT AT ALL
SUM OF ALL RESPONSES TO PHQ QUESTIONS 1-9: 12
CLINICAL INTERPRETATION OF PHQ2 SCORE: 6

## 2019-07-22 NOTE — ASSESSMENT & PLAN NOTE
Chronic problem. Currently uncontrolled. Using her albuterol 3-4 times per day. Previously only used her albuterol if she is around smoke, during allergy season. Not currently on any allergy medications.

## 2019-07-22 NOTE — LETTER
formerly [facility]  Maria E Chau M.D.  [address]  [place] [id number]  Fax: [phone]   Authorization for Release/Disclosure of   Protected Health Information   Name: MITA BELLAMY : 1990 SSN: [id number]   Address: [address] [place] [address]  [place] [address] Phone:    [phone] (home)    I authorize the entity listed below to release/disclose the PHI below to:   formerly [facility]/Maria E Chau M.D. and Maria E Chau M.D.   Provider or Entity Name: Shama Gamez M.D.         Address   City, [facility], [facility]   Phone:      Fax:[phone]     Reason for request: continuity of care   Information to be released:    [  ] LAST COLONOSCOPY,  including any PATH REPORT and follow-up  [  ] LAST FIT/COLOGUARD RESULT [  ] LAST DEXA  [  ] LAST MAMMOGRAM  [  xxxx] LAST PAP  [  ] LAST LABS [  ] RETINA EXAM REPORT  [  ] IMMUNIZATION RECORDS  [  ] Release all info      [  ] Check here and initial the line next to each item to release ALL health information INCLUDING  _____ Care and treatment for drug and / or alcohol abuse  _____ HIV testing, infection status, or AIDS  _____ Genetic Testing    DATES OF SERVICE OR TIME PERIOD TO BE DISCLOSED: _____________  I understand and acknowledge that:  * This Authorization may be revoked at any time by you in writing, except if your health information has already been used or disclosed.  * Your health information that will be used or disclosed as a result of you signing this authorization could be re-disclosed by the recipient. If this occurs, your re-disclosed health information may no longer be protected by State or Federal laws.  * You may refuse to sign this Authorization. Your refusal will not affect your ability to obtain treatment.  * This Authorization becomes effective upon signing and will  on (date) __________.      If no date is indicated, this Authorization will  one (1) year from the signature date.    Name: Mita Bellamy    Signature:   Date:     7/22/2019       PLEASE FAX REQUESTED RECORDS BACK TO: [phone]

## 2019-07-22 NOTE — ASSESSMENT & PLAN NOTE
Continues to have diarrhea as soon as she eats. She is being seen by Gastroenterology ([facility]). She has noted weight loss and blood in the stool in the past. She also had noted erosive gastritis on endoscopy. Her next appt is tomorrow. She has lab work to get done and they are doing a procedure tomorrow. No longer on sucralfate. She is still taking zantac. Also taking zofran for nausea.   Colonoscopy was done 3 weeks ago.

## 2019-07-22 NOTE — ASSESSMENT & PLAN NOTE
The patient states that she experiences intermittent chest pain with palpitatations since she was 22 yrs old. She reports that both of her sisters have an arrhythmia but she unsure what type. She denies any alcohol or illicit drug use. Her biological mother  of an MI at the age of 47yrs. She does have a history of anxiety and panic, however, she states that she get get these symptoms when she does not have anxiety or panic.

## 2019-07-22 NOTE — PROGRESS NOTES
Subjective:     CC: Diagnoses of Generalized abdominal pain, Palpitations, Mild intermittent asthma without complication, and Chest pain, unspecified type were pertinent to this visit.    HPI: Patient is a 29 y.o. female established patient who presents today to establish care.      Generalized abdominal pain  Continues to have diarrhea as soon as she eats. She is being seen by Gastroenterology ([facility]). She has noted weight loss and blood in the stool in the past. She also had noted erosive gastritis on endoscopy. Her next appt is tomorrow with GI. She has lab work to get done and they are doing a procedure tomorrow. No longer on sucralfate. She is still taking zantac. Also taking zofran for nausea.   Colonoscopy was done 3 weeks ago.  Multiple biopsies were done, she has not received the results of the biopsies yet.    Palpitations  The patient states that she experiences intermittent chest pain with palpitatations since she was 22 yrs old. She reports that both of her sisters have an arrhythmia but she unsure what type. She denies any alcohol or illicit drug use. Her biological mother  of an MI at the age of 47yrs (however, she had a long history of drug use). She does have a history of anxiety and panic, however, she states that she can get these symptoms when she does not have anxiety or panic symptoms.     Mild intermittent asthma without complication  Chronic problem. Currently uncontrolled. Using her albuterol 3-4 times per day. Previously only used her albuterol if she is around smoke or during allergy season in the spring time. Not currently on any allergy medications.       Past Medical History:   Diagnosis Date   • Anxiety    • Asthma    • GERD (gastroesophageal reflux disease)        Social History   Substance Use Topics   • Smoking status: Never Smoker   • Smokeless tobacco: Never Used   • Alcohol use No       Current Outpatient Prescriptions Ordered in [facility]   Medication Sig Dispense Refill   •  "VENTOLIN  (90 Base) MCG/ACT Aero Soln inhalation aerosol INHALE 2 PUFFS PO  QID 8.5 g 1   • montelukast (SINGULAIR) 10 MG Tab Take 1 Tab by mouth every day. 30 Tab 5   • ondansetron (ZOFRAN ODT) 4 MG TABLET DISPERSIBLE Take 1 Tab by mouth every 6 hours as needed for Nausea. 10 Tab 3   • ranitidine (ZANTAC) 300 MG tablet Take 1 Tab by mouth every day. 30 Tab 0   • norethindrone (MICRONOR) 0.35 MG tablet Take 1 Tab by mouth every day. (Patient not taking: Reported on 7/22/2019) 28 Tab 3     No current Epic-ordered facility-administered medications on file.        Allergies:  Other food and Codeine    ROS:  Gen: no fevers/chill, + weight loss  CV: + chest pain, + palpitations  GI: + nausea/vomiting, + diarrhea      Objective:       Exam:  BP (!) 92/68 (BP Location: Right arm, Patient Position: Sitting, BP Cuff Size: Adult)   Pulse 71   Temp 36.8 °C (98.2 °F) (Temporal)   Ht 1.651 m (5' 5\")   Wt 53.3 kg (117 lb 8.1 oz)   LMP 07/08/2019   SpO2 95%   Breastfeeding? No   BMI 19.55 kg/m²  Body mass index is 19.55 kg/m².      General: Normal appearing. No distress.  HEAD: NCAT  EYES: conjunctiva clear, lids without ptosis, pupils equal  and reactive to light  EARS: ears normal shape and contour, canals are clear bilaterally, TMs clear  MOUTH: oropharynx is without erythema, edema or exudates.   Neck: Supple without masses. Thyroid is not enlarged. Normal ROM  Pulmonary: Clear to ausculation.  Normal effort. No rales, ronchi, or wheezing.  Cardiovascular: Regular rate and rhythm, no murmur. No LE edema  Neurologic: Grossly normal, no focal deficits  Lymph: No cervical or supraclavicular lymph nodes are palpable  Skin: Warm and dry.  No obvious lesions.  Musculoskeletal: Normal gait and station.   Psych: Normal mood and affect. Alert and oriented x3. Judgment and insight is normal.    Assessment & Plan:     29 y.o. female with the following -     1. Generalized abdominal pain  Chronic problem, associated with " chronic severe diarrhea daily.  Currently having extensive work-up through GI [facility].  She has had endoscopy, colonoscopy and having another procedure done tomorrow.  They are awaiting biopsy results from her colonoscopy.  She has had significant weight loss since this started.  No significant improvement with current medications.    2. Palpitations  Chronic problem, associated with chest pain.  Patient has been having intermittent chest pain with palpitations for the past 7 years.  She does have anxiety and panic, however, states that she can get the chest pain or palpitations when she is calm and has no stress.  Referral placed to cardiology for extended Holter monitor.  - REFERRAL TO CARDIOLOGY    3. Mild intermittent asthma without complication  Chronic problem, uncontrolled.  The patient reports that she is using her albuterol 3-4 times per day.  Spirometry done in the office which showed FVC 91%, FEV1 91%, FEV1 to FVC ratio 100%.  Session quality: C.  I advised the patient that her spirometry looked pretty good.  As such, we will try to treat with montelukast to see if this improves her allergy symptoms.  I do wonder if her perceived shortness of breath has something to do with her GI discomfort as well.  Plan to follow-up in 1 month.  - VENTOLIN  (90 Base) MCG/ACT Aero Soln inhalation aerosol; INHALE 2 PUFFS PO  QID  Dispense: 8.5 g; Refill: 1  - montelukast (SINGULAIR) 10 MG Tab; Take 1 Tab by mouth every day.  Dispense: 30 Tab; Refill: 5    4. Chest pain, unspecified type  Chronic problem, uncontrolled.  See above.  - REFERRAL TO CARDIOLOGY      Return in about 4 weeks (around 8/19/2019).    Please note that this dictation was created using voice recognition software. I have made every reasonable attempt to correct obvious errors, but I expect that there are errors of grammar and possibly content that I did not discover before finalizing the note.

## 2019-07-29 DIAGNOSIS — Z30.011 INITIATION OF OCP (BCP): ICD-10-CM

## 2019-07-29 RX ORDER — ACETAMINOPHEN AND CODEINE PHOSPHATE 120; 12 MG/5ML; MG/5ML
1 SOLUTION ORAL DAILY
Qty: 28 TAB | Refills: 11 | Status: SHIPPED | OUTPATIENT
Start: 2019-07-29 | End: 2020-10-29

## 2019-08-22 ENCOUNTER — APPOINTMENT (OUTPATIENT)
Dept: MEDICAL GROUP | Facility: MEDICAL CENTER | Age: 29
End: 2019-08-22
Payer: COMMERCIAL

## 2019-09-30 ENCOUNTER — HOSPITAL ENCOUNTER (OUTPATIENT)
Facility: MEDICAL CENTER | Age: 29
End: 2019-09-30
Attending: OBSTETRICS & GYNECOLOGY | Admitting: OBSTETRICS & GYNECOLOGY
Payer: COMMERCIAL

## 2019-10-15 ENCOUNTER — OFFICE VISIT (OUTPATIENT)
Dept: MEDICAL GROUP | Facility: MEDICAL CENTER | Age: 29
End: 2019-10-15
Payer: COMMERCIAL

## 2019-10-15 VITALS
BODY MASS INDEX: 19.58 KG/M2 | HEIGHT: 65 IN | OXYGEN SATURATION: 96 % | DIASTOLIC BLOOD PRESSURE: 52 MMHG | SYSTOLIC BLOOD PRESSURE: 90 MMHG | HEART RATE: 88 BPM | WEIGHT: 117.5 LBS | TEMPERATURE: 98.2 F

## 2019-10-15 DIAGNOSIS — R10.84 GENERALIZED ABDOMINAL PAIN: ICD-10-CM

## 2019-10-15 DIAGNOSIS — G43.009 MIGRAINE WITHOUT AURA AND WITHOUT STATUS MIGRAINOSUS, NOT INTRACTABLE: ICD-10-CM

## 2019-10-15 DIAGNOSIS — F41.0 SEVERE ANXIETY WITH PANIC: ICD-10-CM

## 2019-10-15 DIAGNOSIS — F32.9 REACTIVE DEPRESSION: ICD-10-CM

## 2019-10-15 DIAGNOSIS — S92.902D CLOSED FRACTURE OF LEFT FOOT WITH ROUTINE HEALING, SUBSEQUENT ENCOUNTER: ICD-10-CM

## 2019-10-15 PROBLEM — S92.902A CLOSED FRACTURE OF LEFT FOOT: Status: ACTIVE | Noted: 2019-10-15

## 2019-10-15 PROCEDURE — 99214 OFFICE O/P EST MOD 30 MIN: CPT | Performed by: FAMILY MEDICINE

## 2019-10-15 RX ORDER — SUMATRIPTAN 50 MG/1
50 TABLET, FILM COATED ORAL
Qty: 10 TAB | Refills: 3 | Status: SHIPPED | OUTPATIENT
Start: 2019-10-15 | End: 2019-11-13

## 2019-10-15 RX ORDER — NORETHINDRONE ACETATE AND ETHINYL ESTRADIOL .02; 1 MG/1; MG/1
1 TABLET ORAL DAILY
COMMUNITY
End: 2019-11-13

## 2019-10-15 RX ORDER — NORTRIPTYLINE HYDROCHLORIDE 10 MG/1
10 CAPSULE ORAL NIGHTLY
COMMUNITY
End: 2020-12-31

## 2019-10-15 RX ORDER — ONDANSETRON 4 MG/1
4 TABLET, ORALLY DISINTEGRATING ORAL EVERY 6 HOURS PRN
Qty: 10 TAB | Refills: 0 | Status: SHIPPED | OUTPATIENT
Start: 2019-10-15 | End: 2019-11-08 | Stop reason: SDUPTHER

## 2019-10-15 RX ORDER — SERTRALINE HYDROCHLORIDE 25 MG/1
25 TABLET, FILM COATED ORAL DAILY
Qty: 30 TAB | Refills: 5 | Status: SHIPPED | OUTPATIENT
Start: 2019-10-15 | End: 2019-11-13 | Stop reason: SDUPTHER

## 2019-10-15 RX ORDER — HYDROXYZINE HYDROCHLORIDE 25 MG/1
25-50 TABLET, FILM COATED ORAL NIGHTLY PRN
Qty: 15 TAB | Refills: 0 | Status: SHIPPED | OUTPATIENT
Start: 2019-10-15 | End: 2020-04-02 | Stop reason: SDUPTHER

## 2019-10-15 RX ORDER — MONTELUKAST SODIUM 4 MG/1
1 TABLET, CHEWABLE ORAL 2 TIMES DAILY
COMMUNITY
End: 2019-11-13

## 2019-10-15 NOTE — PROGRESS NOTES
Subjective:     CC: Diagnoses of Closed fracture of left foot with routine healing, subsequent encounter, Migraine without aura and without status migrainosus, not intractable, Generalized abdominal pain, Reactive depression, and Severe anxiety with panic were pertinent to this visit.    HPI: Patient is a 29 y.o. female established patient who presents today with concern regarding recent foot fracture, migraine and depression.       Closed fracture of left foot  New problem.  Jammed her foot on the side of the crib while running to get her daughter. Seen by [facility] 5 days ago. Has a walking boot. Did not get referred anywhere from the ER.  She is here today for referral.  Pain is generally well controlled.  Walking well with her walking boot.  Was told she fractured her fifth metatarsal.  We do not have imaging today.    Migraine without aura and without status migrainosus, not intractable  New problem.  She reports that she has a long history of migraines. She takes tylenol usually. She states that her whole head is pulsating. Has severe light and sound sensitivity. She has significant nausea. No vomiting.   Never taking prescription medication.   No numbness, tingling or vision changes.   She has been having daily, more significant migraines daily for the past 2 weeks. She is having a hard time sleeping due to the pain.   Unknown what her triggers are.   The patient states that she has had daily headaches since 2014. Usually improves with tylenol.     Reactive depression  New problem.  Mom passed away 2 weeks ago. Not coping well.   Her  is now being deployed and they are also losing their home.   She recently missed two weeks of work due to her mother passing and now she fractured her foot so is unable to work again.   She states she has been feeling overwhelmed and depressed.   Having a hard time getting out of bed.   Denies any SI/HI.   She has a 1 yr old and 7 yr old.   She doesn't have much help  "with her baby who is 1 today.   Also has severe anxiety at baseline.      Past Medical History:   Diagnosis Date   • Anxiety    • Asthma    • GERD (gastroesophageal reflux disease)        Social History     Tobacco Use   • Smoking status: Never Smoker   • Smokeless tobacco: Never Used   Substance Use Topics   • Alcohol use: No   • Drug use: No       Current Outpatient Medications Ordered in Epic   Medication Sig Dispense Refill   • nortriptyline (PAMELOR) 10 MG Cap Take 10 mg by mouth every evening.     • colestipol (COLESTID) 1 GM Tab Take 1 g by mouth 2 times a day.     • norethindrone-ethinyl estradiol (JUNEL 1/20) 1-20 MG-MCG per tablet Take 1 Tab by mouth every day.     • ondansetron (ZOFRAN ODT) 4 MG TABLET DISPERSIBLE Take 1 Tab by mouth every 6 hours as needed for Nausea. 10 Tab 0   • SUMAtriptan (IMITREX) 50 MG Tab Take 1 Tab by mouth Once PRN for Migraine for up to 1 dose. 10 Tab 3   • hydrOXYzine HCl (ATARAX) 25 MG Tab Take 1-2 Tabs by mouth at bedtime as needed (insomnia). 15 Tab 0   • sertraline (ZOLOFT) 25 MG tablet Take 1 Tab by mouth every day. 30 Tab 5   • norethindrone (MICRONOR) 0.35 MG tablet Take 1 Tab by mouth every day. 28 Tab 11   • VENTOLIN  (90 Base) MCG/ACT Aero Soln inhalation aerosol INHALE 2 PUFFS PO  QID 8.5 g 1   • montelukast (SINGULAIR) 10 MG Tab Take 1 Tab by mouth every day. 30 Tab 5   • ranitidine (ZANTAC) 300 MG tablet Take 1 Tab by mouth every day. (Patient not taking: Reported on 10/15/2019) 30 Tab 0     No current Epic-ordered facility-administered medications on file.        Allergies:  Other food and Codeine    Health Maintenance: Completed    ROS:  Pulm: no sob, no cough  CV: no chest pain, no palpitations      Objective:       Exam:  BP (!) 90/52 (BP Location: Right arm, Patient Position: Sitting, BP Cuff Size: Adult)   Pulse 88   Temp 36.8 °C (98.2 °F) (Temporal)   Ht 1.651 m (5' 5\")   Wt 53.3 kg (117 lb 8.1 oz)   LMP 10/15/2019   SpO2 96%   Breastfeeding? " No   BMI 19.55 kg/m²  Body mass index is 19.55 kg/m².      General: Normal appearing. No distress.  HEAD: NCAT  EYES: conjunctiva clear, lids without ptosis, pupils equal  and reactive to light  EARS: ears normal shape and contour, canals are clear bilaterally, TMs clear  MOUTH: oropharynx is without erythema, edema or exudates.   Neck: Supple without masses. Thyroid is not enlarged. Normal ROM  Pulmonary: Clear to ausculation.  Normal effort. No rales, ronchi, or wheezing.  Cardiovascular: Regular rate and rhythm, no murmur. No LE edema  Neurologic: Grossly normal, no focal deficits  Lymph: No cervical or supraclavicular lymph nodes are palpable  Skin: Warm and dry.  No obvious lesions.  Musculoskeletal: Normal gait and station.   Psych: Normal mood and affect. Alert and oriented x3. Judgment and insight is normal.    Assessment & Plan:     29 y.o. female with the following -     1. Closed fracture of left foot with routine healing, subsequent encounter  New problem.  X-rays done at [facility].  Currently has a walking boot, pain well controlled.  Referral placed to sports medicine.  - REFERRAL TO SPORTS MEDICINE    2. Migraine without aura and without status migrainosus, not intractable  New problem.  Patient seems to currently be in a headache cycle for the past couple weeks.  Prescription given for Zofran, Imitrex and hydroxyzine to be taken out once this evening.  Referral placed to neurology.  Plan to follow-up in 1 month.  - ondansetron (ZOFRAN ODT) 4 MG TABLET DISPERSIBLE; Take 1 Tab by mouth every 6 hours as needed for Nausea.  Dispense: 10 Tab; Refill: 0  - SUMAtriptan (IMITREX) 50 MG Tab; Take 1 Tab by mouth Once PRN for Migraine for up to 1 dose.  Dispense: 10 Tab; Refill: 3  - hydrOXYzine HCl (ATARAX) 25 MG Tab; Take 1-2 Tabs by mouth at bedtime as needed (insomnia).  Dispense: 15 Tab; Refill: 0  - REFERRAL TO NEUROLOGY      3. Reactive depression  New problem.  Patient is extremely tearful on exam  today.  Has had multiple life stressors causing significant depression.  Also has baseline anxiety.  Plan to start Zoloft 25 mg.  Urgent referral placed to behavioral health.  Patient denies any suicidal ideation.  States that she is able to take care of her 1-year-old.  Plan to follow-up in 1 month.  Patient does have a plan if she is feeling severely depressed, she states she can always call her mother-in-law who is very helpful.  - REFERRAL TO BEHAVIORAL HEALTH  - sertraline (ZOLOFT) 25 MG tablet; Take 1 Tab by mouth every day.  Dispense: 30 Tab; Refill: 5    5. Severe anxiety with panic  Chronic problem, uncontrolled.  See above for plan.  - sertraline (ZOLOFT) 25 MG tablet; Take 1 Tab by mouth every day.  Dispense: 30 Tab; Refill: 5      Return in about 4 weeks (around 11/12/2019).    Please note that this dictation was created using voice recognition software. I have made every reasonable attempt to correct obvious errors, but I expect that there are errors of grammar and possibly content that I did not discover before finalizing the note.

## 2019-10-15 NOTE — ASSESSMENT & PLAN NOTE
Jammed her foot on the side of the crib. Seen by [facility] 5 days ago. Has a walking boot. Did not get referred anywhere from the ER.

## 2019-10-15 NOTE — ASSESSMENT & PLAN NOTE
Mom passed away 2 weeks ago. Not coping well.   Her  is now being deployed and they are also losing their home.   She recently missed two weeks of work due to her mother passing and now she fractured her foot so is unable to work again.   She states she has been feeling overwhelmed and depressed.   Having a hard time getting out of bed.   Denies any SI/HI.   She has a 1 yr old and 7 yr old.   She doesn't have much help with her baby who is 1 today.

## 2019-10-15 NOTE — ASSESSMENT & PLAN NOTE
Has a long history of migraines. She takes tylenol usually. She states that her whole head is pulsating. Has severe light and sound sensitivity. She has significant nausea. No vomiting.   Never taking prescription medication.   No numbness, tingling or vision changes.   She has been having daily migraines daily for the past 2 weeks. She is having a hard time sleeping due to the pain.   Unknown what her triggers are.   The patient state that she has had daily headaches since 2014. Usually improves with tylenol.

## 2019-11-08 DIAGNOSIS — G43.009 MIGRAINE WITHOUT AURA AND WITHOUT STATUS MIGRAINOSUS, NOT INTRACTABLE: ICD-10-CM

## 2019-11-08 DIAGNOSIS — R10.84 GENERALIZED ABDOMINAL PAIN: ICD-10-CM

## 2019-11-08 RX ORDER — ONDANSETRON 4 MG/1
4 TABLET, ORALLY DISINTEGRATING ORAL EVERY 6 HOURS PRN
Qty: 10 TAB | Refills: 0 | Status: SHIPPED | OUTPATIENT
Start: 2019-11-08 | End: 2019-12-10 | Stop reason: SDUPTHER

## 2019-11-13 ENCOUNTER — OFFICE VISIT (OUTPATIENT)
Dept: MEDICAL GROUP | Facility: MEDICAL CENTER | Age: 29
End: 2019-11-13
Payer: COMMERCIAL

## 2019-11-13 VITALS
RESPIRATION RATE: 16 BRPM | BODY MASS INDEX: 18.99 KG/M2 | WEIGHT: 118.17 LBS | DIASTOLIC BLOOD PRESSURE: 70 MMHG | HEIGHT: 66 IN | HEART RATE: 75 BPM | TEMPERATURE: 98.2 F | SYSTOLIC BLOOD PRESSURE: 112 MMHG | OXYGEN SATURATION: 96 %

## 2019-11-13 DIAGNOSIS — F32.9 REACTIVE DEPRESSION: ICD-10-CM

## 2019-11-13 DIAGNOSIS — F41.0 SEVERE ANXIETY WITH PANIC: ICD-10-CM

## 2019-11-13 DIAGNOSIS — G43.009 MIGRAINE WITHOUT AURA AND WITHOUT STATUS MIGRAINOSUS, NOT INTRACTABLE: ICD-10-CM

## 2019-11-13 PROCEDURE — 99214 OFFICE O/P EST MOD 30 MIN: CPT | Performed by: FAMILY MEDICINE

## 2019-11-13 RX ORDER — RIZATRIPTAN BENZOATE 10 MG/1
10 TABLET ORAL
Qty: 10 TAB | Refills: 3 | Status: SHIPPED | OUTPATIENT
Start: 2019-11-13 | End: 2020-06-21 | Stop reason: SDUPTHER

## 2019-11-13 RX ORDER — PROPRANOLOL HYDROCHLORIDE 10 MG/1
20 TABLET ORAL 2 TIMES DAILY
Qty: 60 TAB | Refills: 1 | Status: SHIPPED
Start: 2019-11-13 | End: 2019-12-19

## 2019-11-13 NOTE — PROGRESS NOTES
Subjective:     CC: Diagnoses of Reactive depression, Severe anxiety with panic, and Migraine without aura and without status migrainosus, not intractable were pertinent to this visit.    HPI: Patient is a 29 y.o. female established patient who presents today to follow-up as well as concern regarding severe migraine.      Reactive depression  Moderate improvement with zoloft. However, still having panic attacks which make it so she feels like she can't leave the house.  She does state that she is been getting out of bed easier.  Having an easier time keeping the house clean.    Severe anxiety with panic  Mild improvement with zoloft. However, no improvement in severity or frequency of panic attacks.  No improvement with hydroxyzine regarding the panic attacks.  However, she does use hydroxyzine to help her sleep at night.    Migraine without aura and without status migrainosus, not intractable  Chronic problem, uncontrolled.  She has noticed some improvement in frequency of migraines up until the last 3 days when the she had one for 3 days in a row.  She reports this migraine is been severe, having nausea, light sensitivity, sound sensitivity and severe pain bilaterally in the head.  She does not note any improvement with sumatriptan.  She did have some tramadol at home, states that she noted improvement with the tramadol.      Past Medical History:   Diagnosis Date   • Anxiety    • Asthma    • GERD (gastroesophageal reflux disease)        Social History     Tobacco Use   • Smoking status: Never Smoker   • Smokeless tobacco: Never Used   Substance Use Topics   • Alcohol use: No   • Drug use: No       Current Outpatient Medications Ordered in Epic   Medication Sig Dispense Refill   • sertraline (ZOLOFT) 50 MG Tab Take 1 Tab by mouth every day. 30 Tab 2   • rizatriptan (MAXALT) 10 MG tablet Take 1 Tab by mouth Once PRN for Migraine for up to 1 dose. 10 Tab 3   • propranolol (INDERAL) 10 MG Tab Take 2 Tabs by mouth 2  "times a day. 60 Tab 1   • ondansetron (ZOFRAN ODT) 4 MG TABLET DISPERSIBLE Take 1 Tab by mouth every 6 hours as needed for Nausea. 10 Tab 0   • nortriptyline (PAMELOR) 10 MG Cap Take 10 mg by mouth every evening.     • hydrOXYzine HCl (ATARAX) 25 MG Tab Take 1-2 Tabs by mouth at bedtime as needed (insomnia). 15 Tab 0   • norethindrone (MICRONOR) 0.35 MG tablet Take 1 Tab by mouth every day. 28 Tab 11   • VENTOLIN  (90 Base) MCG/ACT Aero Soln inhalation aerosol INHALE 2 PUFFS PO  QID 8.5 g 1   • montelukast (SINGULAIR) 10 MG Tab Take 1 Tab by mouth every day. 30 Tab 5     No current Epic-ordered facility-administered medications on file.        Allergies:  Other food and Codeine    Health Maintenance: Completed    ROS:  Pulm: no sob, no cough  CV: no chest pain, no palpitations        Objective:       Exam:  /70 (BP Location: Right arm, Patient Position: Sitting, BP Cuff Size: Adult)   Pulse 75   Temp 36.8 °C (98.2 °F) (Temporal)   Resp 16   Ht 1.676 m (5' 6\")   Wt 53.6 kg (118 lb 2.7 oz)   LMP 10/30/2019   SpO2 96%   BMI 19.07 kg/m²  Body mass index is 19.07 kg/m².    General: Normal appearing. No distress.  HEAD: NCAT  EYES: conjunctiva clear, lids without ptosis, pupils equal and reactive to light  EARS: ears normal shape and contour.  MOUTH: normal dentition   Neck:  Normal ROM  Pulmonary: Normal effort. Normal respiratory rate.  Cardiovascular: Well perfused. No LE edema  Neurologic: Grossly normal, no focal deficits  Skin: Warm and dry.  No obvious lesions.  Musculoskeletal: Normal gait and station.   Psych: Normal mood and affect. Alert and oriented x3. Judgment and insight is normal.      Assessment & Plan:     29 y.o. female with the following -     1. Reactive depression  Chronic problem, improved with Zoloft.  However, anxiety with panic is still severe plan to increase Zoloft to 50 mg daily.  - sertraline (ZOLOFT) 50 MG Tab; Take 1 Tab by mouth every day.  Dispense: 30 Tab; Refill: " 2    2. Severe anxiety with panic  Chronic problem, uncontrolled.  Continues to have panic.  Hydroxyzine is not helpful.  As above, plan to increase Zoloft 50 mill grams daily.  Plan to follow-up in 1 month.  - sertraline (ZOLOFT) 50 MG Tab; Take 1 Tab by mouth every day.  Dispense: 30 Tab; Refill: 2    3. Migraine without aura and without status migrainosus, not intractable  Chronic problem, uncontrolled.  No improvement with sumatriptan.  Plan to switch to rizatriptan to be taken as needed.  Also plan to start a daily medicine, propranolol.  Referral was placed to neurology at last appointment.  I did give her the phone number to schedule an appointment today.  - rizatriptan (MAXALT) 10 MG tablet; Take 1 Tab by mouth Once PRN for Migraine for up to 1 dose.  Dispense: 10 Tab; Refill: 3  - propranolol (INDERAL) 10 MG Tab; Take 2 Tabs by mouth 2 times a day.  Dispense: 60 Tab; Refill: 1      Return in about 4 weeks (around 12/11/2019).    Please note that this dictation was created using voice recognition software. I have made every reasonable attempt to correct obvious errors, but I expect that there are errors of grammar and possibly content that I did not discover before finalizing the note.

## 2019-11-13 NOTE — ASSESSMENT & PLAN NOTE
She has noticed some improvement up until the last 3 days when the she had one for 3 days in a row.

## 2019-11-13 NOTE — ASSESSMENT & PLAN NOTE
Moderate improvement with zoloft. However, still having panic attacks which make it so she feels like she can't leave the house.

## 2019-11-15 ENCOUNTER — APPOINTMENT (OUTPATIENT)
Dept: MEDICAL GROUP | Facility: MEDICAL CENTER | Age: 29
End: 2019-11-15
Payer: COMMERCIAL

## 2019-11-25 DIAGNOSIS — J45.20 MILD INTERMITTENT ASTHMA WITHOUT COMPLICATION: ICD-10-CM

## 2019-11-25 RX ORDER — MONTELUKAST SODIUM 10 MG/1
10 TABLET ORAL DAILY
Qty: 30 TAB | Refills: 5 | Status: SHIPPED | OUTPATIENT
Start: 2019-11-25 | End: 2020-11-03

## 2019-12-10 DIAGNOSIS — G43.009 MIGRAINE WITHOUT AURA AND WITHOUT STATUS MIGRAINOSUS, NOT INTRACTABLE: ICD-10-CM

## 2019-12-10 DIAGNOSIS — R10.84 GENERALIZED ABDOMINAL PAIN: ICD-10-CM

## 2019-12-10 RX ORDER — ONDANSETRON 4 MG/1
4 TABLET, ORALLY DISINTEGRATING ORAL EVERY 6 HOURS PRN
Qty: 10 TAB | Refills: 0 | Status: SHIPPED | OUTPATIENT
Start: 2019-12-10 | End: 2020-05-28 | Stop reason: SDUPTHER

## 2019-12-19 ENCOUNTER — OFFICE VISIT (OUTPATIENT)
Dept: MEDICAL GROUP | Facility: MEDICAL CENTER | Age: 29
End: 2019-12-19
Payer: COMMERCIAL

## 2019-12-19 VITALS
DIASTOLIC BLOOD PRESSURE: 70 MMHG | WEIGHT: 118.83 LBS | SYSTOLIC BLOOD PRESSURE: 104 MMHG | HEART RATE: 64 BPM | HEIGHT: 66 IN | BODY MASS INDEX: 19.1 KG/M2 | OXYGEN SATURATION: 95 % | TEMPERATURE: 98.7 F

## 2019-12-19 DIAGNOSIS — R20.0 RIGHT ARM NUMBNESS: ICD-10-CM

## 2019-12-19 DIAGNOSIS — G43.009 MIGRAINE WITHOUT AURA AND WITHOUT STATUS MIGRAINOSUS, NOT INTRACTABLE: ICD-10-CM

## 2019-12-19 PROCEDURE — 99214 OFFICE O/P EST MOD 30 MIN: CPT | Performed by: FAMILY MEDICINE

## 2019-12-19 RX ORDER — TOPIRAMATE 50 MG/1
TABLET, FILM COATED ORAL
Qty: 60 TAB | Refills: 1 | Status: SHIPPED | OUTPATIENT
Start: 2019-12-19 | End: 2020-05-08 | Stop reason: SDUPTHER

## 2019-12-19 NOTE — ASSESSMENT & PLAN NOTE
[facility]. She wakes up with a headache and goes to bed with a headache.   Daily forgetting where she is going and feels lost while driving.   Had eye exam.   Seems worse getting lost due the migraines.   Rizatriptan seems to be helping.   No improvement with propranolol.   Already taking nortriptyline nightly for sleep.   Pulsing pain on right side, then radiates to the rest of the head.

## 2019-12-19 NOTE — ASSESSMENT & PLAN NOTE
Chronic problem. Intermittently has some numbness in the upper right arm. Improved with chiro. Occurs a couple times per month now. Can last a couple hours or even all day but resolves on its own. No weakness.

## 2019-12-19 NOTE — PROGRESS NOTES
Subjective:     CC: Diagnoses of Migraine without aura and without status migrainosus, not intractable and Right arm numbness were pertinent to this visit.    HPI: Patient is a 29 y.o. female established patient who presents today to follow-up on migraines.      Migraine without aura and without status migrainosus, not intractable  The patient states she is continuing to have daily headaches. She wakes up with a headache and goes to bed with a headache.   The patient states that she is having issues with her memory during her migraines.  Daily she has been forgetting where she is going and feels lost while driving.   Had eye exam which was normal.   Rizatriptan seems to be helping but the headache comes back by the end of the day.   No improvement with propranolol which is only started at her last appointment 1 month ago.   Already taking nortriptyline nightly for sleep.   The patient reports the pain is a pulsing pain on right side, then radiates to the rest of the head.     Right arm numbness  Chronic problem. Intermittently has some numbness in the upper right arm. Improved with chiro. Occurs a couple times per month now. Can last a couple hours or even all day but resolves on its own. No weakness.         Past Medical History:   Diagnosis Date   • Anxiety    • Asthma    • GERD (gastroesophageal reflux disease)        Social History     Tobacco Use   • Smoking status: Never Smoker   • Smokeless tobacco: Never Used   Substance Use Topics   • Alcohol use: No   • Drug use: No       Current Outpatient Medications Ordered in Epic   Medication Sig Dispense Refill   • topiramate (TOPAMAX) 50 MG tablet Take 1 tab po once daily x 1 week then increase to 1 tab po BID thereafter. 60 Tab 1   • ondansetron (ZOFRAN ODT) 4 MG TABLET DISPERSIBLE Take 1 Tab by mouth every 6 hours as needed for Nausea. 10 Tab 0   • montelukast (SINGULAIR) 10 MG Tab Take 1 Tab by mouth every day. 30 Tab 5   • rizatriptan (MAXALT) 10 MG tablet Take  "1 Tab by mouth Once PRN for Migraine for up to 1 dose. 10 Tab 3   • nortriptyline (PAMELOR) 10 MG Cap Take 10 mg by mouth every evening.     • hydrOXYzine HCl (ATARAX) 25 MG Tab Take 1-2 Tabs by mouth at bedtime as needed (insomnia). 15 Tab 0   • norethindrone (MICRONOR) 0.35 MG tablet Take 1 Tab by mouth every day. 28 Tab 11   • VENTOLIN  (90 Base) MCG/ACT Aero Soln inhalation aerosol INHALE 2 PUFFS PO  QID 8.5 g 1   • sertraline (ZOLOFT) 50 MG Tab Take 1 Tab by mouth every day. (Patient not taking: Reported on 12/19/2019) 30 Tab 2     No current Epic-ordered facility-administered medications on file.        Allergies:  Other food and Codeine    Health Maintenance: Completed    ROS:  Pulm: no sob, no cough  CV: no chest pain, no palpitations      Objective:       Exam:  /70 (BP Location: Left arm, Patient Position: Sitting, BP Cuff Size: Adult)   Pulse 64   Temp 37.1 °C (98.7 °F) (Temporal)   Ht 1.676 m (5' 6\")   Wt 53.9 kg (118 lb 13.3 oz)   SpO2 95%   BMI 19.18 kg/m²  Body mass index is 19.18 kg/m².    General: Normal appearing. No distress.  HEAD: NCAT  EYES: conjunctiva clear, lids without ptosis, pupils equal and reactive to light  EARS: ears normal shape and contour.  MOUTH: normal dentition   Neck:  Normal ROM  Pulmonary: Normal effort. Normal respiratory rate.  Cardiovascular: Well perfused. No LE edema  Neurologic: Grossly normal, no focal deficits  Skin: Warm and dry.  No obvious lesions.  Musculoskeletal: Normal gait and station.   Psych: Normal mood and affect. Alert and oriented x3. Judgment and insight is normal.    Assessment & Plan:     29 y.o. female with the following -     1. Migraine without aura and without status migrainosus, not intractable  Chronic problem, uncontrolled.  Normal neurological exam today.  No improvement with propranolol, plan to start topiramate.  Rizatriptan is helping somewhat.  Given that the patient states that she is having some abnormal symptoms of " confusion and feeling lost I did order a CT of the head.  Plan to follow-up in 1 month.  - topiramate (TOPAMAX) 50 MG tablet; Take 1 tab po once daily x 1 week then increase to 1 tab po BID thereafter.  Dispense: 60 Tab; Refill: 1  - CT-HEAD W/O; Future    2. Right arm numbness  New problem.  The patient states she has had this issue for years.  Intermittent.  Normal exam today.  Etiology is very unclear.  Unlikely to be due to a radiculopathy given that it improves for weeks on end.  Referral has already been placed to neurology, the patient is going to discuss it with them.      Return in about 4 weeks (around 1/16/2020).    Please note that this dictation was created using voice recognition software. I have made every reasonable attempt to correct obvious errors, but I expect that there are errors of grammar and possibly content that I did not discover before finalizing the note.

## 2020-01-02 ENCOUNTER — OFFICE VISIT (OUTPATIENT)
Dept: BEHAVIORAL HEALTH | Facility: CLINIC | Age: 30
End: 2020-01-02
Payer: COMMERCIAL

## 2020-01-02 DIAGNOSIS — F41.0 GENERALIZED ANXIETY DISORDER WITH PANIC ATTACKS: ICD-10-CM

## 2020-01-02 DIAGNOSIS — F41.1 GENERALIZED ANXIETY DISORDER WITH PANIC ATTACKS: ICD-10-CM

## 2020-01-02 PROCEDURE — 90791 PSYCH DIAGNOSTIC EVALUATION: CPT | Performed by: MARRIAGE & FAMILY THERAPIST
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
## 2020-04-02 DIAGNOSIS — G43.009 MIGRAINE WITHOUT AURA AND WITHOUT STATUS MIGRAINOSUS, NOT INTRACTABLE: ICD-10-CM

## 2020-04-02 RX ORDER — HYDROXYZINE HYDROCHLORIDE 25 MG/1
25-50 TABLET, FILM COATED ORAL NIGHTLY PRN
Qty: 15 TAB | Refills: 0 | Status: SHIPPED | OUTPATIENT
Start: 2020-04-02 | End: 2020-06-21 | Stop reason: SDUPTHER

## 2020-05-08 ENCOUNTER — TELEMEDICINE (OUTPATIENT)
Dept: MEDICAL GROUP | Facility: MEDICAL CENTER | Age: 30
End: 2020-05-08
Payer: COMMERCIAL

## 2020-05-08 VITALS — WEIGHT: 110 LBS | BODY MASS INDEX: 17.68 KG/M2 | TEMPERATURE: 98.6 F | HEIGHT: 66 IN

## 2020-05-08 DIAGNOSIS — J45.20 MILD INTERMITTENT ASTHMA WITHOUT COMPLICATION: ICD-10-CM

## 2020-05-08 DIAGNOSIS — G43.009 MIGRAINE WITHOUT AURA AND WITHOUT STATUS MIGRAINOSUS, NOT INTRACTABLE: ICD-10-CM

## 2020-05-08 PROCEDURE — 99214 OFFICE O/P EST MOD 30 MIN: CPT | Mod: 95,CR | Performed by: FAMILY MEDICINE

## 2020-05-08 RX ORDER — TOPIRAMATE 50 MG/1
TABLET, FILM COATED ORAL
Qty: 60 TAB | Refills: 1 | Status: SHIPPED | OUTPATIENT
Start: 2020-05-08 | End: 2020-10-13 | Stop reason: SDUPTHER

## 2020-05-08 NOTE — ASSESSMENT & PLAN NOTE
topiramate has reduced the frequency   Now having them a couple times per week  Was having them nearly daily

## 2020-05-11 ENCOUNTER — OFFICE VISIT (OUTPATIENT)
Dept: MEDICAL GROUP | Facility: MEDICAL CENTER | Age: 30
End: 2020-05-11
Payer: COMMERCIAL

## 2020-05-11 VITALS
TEMPERATURE: 97.8 F | SYSTOLIC BLOOD PRESSURE: 122 MMHG | HEART RATE: 78 BPM | OXYGEN SATURATION: 97 % | BODY MASS INDEX: 18.77 KG/M2 | WEIGHT: 116.8 LBS | HEIGHT: 66 IN | DIASTOLIC BLOOD PRESSURE: 64 MMHG

## 2020-05-11 DIAGNOSIS — J45.20 MILD INTERMITTENT ASTHMA WITHOUT COMPLICATION: ICD-10-CM

## 2020-05-11 DIAGNOSIS — R07.9 CHEST PAIN, UNSPECIFIED TYPE: ICD-10-CM

## 2020-05-11 DIAGNOSIS — K29.60 EROSIVE GASTRITIS: ICD-10-CM

## 2020-05-11 DIAGNOSIS — F32.9 REACTIVE DEPRESSION: ICD-10-CM

## 2020-05-11 DIAGNOSIS — G43.009 MIGRAINE WITHOUT AURA AND WITHOUT STATUS MIGRAINOSUS, NOT INTRACTABLE: ICD-10-CM

## 2020-05-11 DIAGNOSIS — F41.0 SEVERE ANXIETY WITH PANIC: ICD-10-CM

## 2020-05-11 PROBLEM — R10.84 GENERALIZED ABDOMINAL PAIN: Status: RESOLVED | Noted: 2019-01-04 | Resolved: 2020-05-11

## 2020-05-11 PROBLEM — K92.1 MELENA: Status: RESOLVED | Noted: 2019-01-04 | Resolved: 2020-05-11

## 2020-05-11 PROBLEM — S92.902A CLOSED FRACTURE OF LEFT FOOT: Status: RESOLVED | Noted: 2019-10-15 | Resolved: 2020-05-11

## 2020-05-11 PROBLEM — Z30.011 INITIATION OF OCP (BCP): Status: RESOLVED | Noted: 2019-01-04 | Resolved: 2020-05-11

## 2020-05-11 PROBLEM — Z01.818 TUBAL LIGATION EVALUATION: Status: RESOLVED | Noted: 2019-01-04 | Resolved: 2020-05-11

## 2020-05-11 PROBLEM — R00.2 PALPITATIONS: Status: RESOLVED | Noted: 2019-01-04 | Resolved: 2020-05-11

## 2020-05-11 PROBLEM — R20.0 RIGHT ARM NUMBNESS: Status: RESOLVED | Noted: 2019-12-19 | Resolved: 2020-05-11

## 2020-05-11 PROCEDURE — 99214 OFFICE O/P EST MOD 30 MIN: CPT | Performed by: FAMILY MEDICINE

## 2020-05-11 NOTE — ASSESSMENT & PLAN NOTE
Chronic problem. Well controlled. Exercising regularly. Denies any SOB. Uses ventolin as needed. Using singulair for allergies.

## 2020-05-11 NOTE — LETTER
May 11, 2020    To Whom It May Concern:         This is confirmation that Mita Bellamy attended her scheduled appointment with Maria E Chau M.D. on 5/11/20.    She was seen and examined today. She if free to start work. She is well and all her chronic medical conditions are well controlled.          If you have any questions please do not hesitate to call me at the phone number listed below.    Sincerely,          Maria E Chau M.D.  [phone]

## 2020-05-11 NOTE — ASSESSMENT & PLAN NOTE
Chronic problem. No issues with pain currently. Last endoscopy was in Feb 2019. Was advised her mucosa looked normal but did have some tightening of the esophagus. Normal colonoscopy as well.

## 2020-05-11 NOTE — PROGRESS NOTES
Subjective:     CC: Diagnoses of Migraine without aura and without status migrainosus, not intractable, Reactive depression, Mild intermittent asthma without complication, Chest pain, unspecified type, Severe anxiety with panic, and Erosive gastritis were pertinent to this visit.    HPI: Patient is a 29 y.o. female established patient who presents today for work physical.  She is hoping to return to work as a .      Migraine without aura and without status migrainosus, not intractable  Chronic problem.  Increased topirimate. Taking 2 in the mroning and 1 at night. Seems to be imrpoving already.  No headaches of the last 3 days.    Reactive depression  Chronic problem. Well controlled on zoloft 50 mg.    Mild intermittent asthma without complication  Chronic problem. Well controlled. Exercising regularly. Denies any SOB. Uses ventolin as needed. Using singulair for allergies.     Chest pain  History of chest pain. Now resolved with zoloft.  Likely due to anxiety.    Severe anxiety with panic  Chronic problem. Doing quite well currently. Would like to get back to work. Seeing a therapist.     Erosive gastritis  Chronic problem. No issues with pain currently or over the past couple months.  She notes general improvement in all of her GI symptoms since starting on the Zoloft.  Last endoscopy/colonoscopy was in Feb 2019. Was advised her mucosa looked normal but did have some tightening of the esophagus.  We do not have those records.    Past Medical History:   Diagnosis Date   • Anxiety    • Asthma    • GERD (gastroesophageal reflux disease)        Social History     Tobacco Use   • Smoking status: Never Smoker   • Smokeless tobacco: Never Used   Substance Use Topics   • Alcohol use: No   • Drug use: No       Current Outpatient Medications Ordered in Epic   Medication Sig Dispense Refill   • topiramate (TOPAMAX) 50 MG tablet Take 1 po q am and 2 po qhs x 1 week, then increased 2po BID 60 Tab 1   •  "hydrOXYzine HCl (ATARAX) 25 MG Tab Take 1-2 Tabs by mouth at bedtime as needed (insomnia). 15 Tab 0   • ondansetron (ZOFRAN ODT) 4 MG TABLET DISPERSIBLE Take 1 Tab by mouth every 6 hours as needed for Nausea. 10 Tab 0   • montelukast (SINGULAIR) 10 MG Tab Take 1 Tab by mouth every day. 30 Tab 5   • rizatriptan (MAXALT) 10 MG tablet Take 1 Tab by mouth Once PRN for Migraine for up to 1 dose. 10 Tab 3   • nortriptyline (PAMELOR) 10 MG Cap Take 10 mg by mouth every evening.     • norethindrone (MICRONOR) 0.35 MG tablet Take 1 Tab by mouth every day. 28 Tab 11   • VENTOLIN  (90 Base) MCG/ACT Aero Soln inhalation aerosol INHALE 2 PUFFS PO  QID 8.5 g 1   • sertraline (ZOLOFT) 50 MG Tab Take 1 Tab by mouth every day. (Patient not taking: Reported on 12/19/2019) 30 Tab 2     No current Epic-ordered facility-administered medications on file.        Allergies:  Other food and Codeine    Health Maintenance: Completed    ROS:  Pulm: no sob, no cough  CV: no chest pain, no palpitations      Objective:       Exam:  /64 (BP Location: Right arm, Patient Position: Sitting, BP Cuff Size: Adult long)   Pulse 78   Temp 36.6 °C (97.8 °F) (Temporal)   Ht 1.676 m (5' 6\")   Wt 53 kg (116 lb 12.8 oz)   LMP 05/03/2020 (Exact Date)   SpO2 97%   BMI 18.85 kg/m²  Body mass index is 18.85 kg/m².      General: Normal appearing. No distress.  HEAD: NCAT  EYES: conjunctiva clear, lids without ptosis, pupils equal  and reactive to light  EARS: ears normal shape and contour, canals are clear bilaterally, TMs clear  MOUTH: oropharynx is without erythema, edema or exudates.   Neck: Supple without masses. Thyroid is not enlarged. Normal ROM  Pulmonary: Clear to ausculation.  Normal effort. No rales, ronchi, or wheezing.  Cardiovascular: Regular rate and rhythm, no murmur. No LE edema  Abdomen: Soft, nontender, nondistended.  No hepatosplenomegaly  Neurologic: Grossly normal, no focal deficits  Lymph: No cervical or supraclavicular " lymph nodes are palpable  Skin: Warm and dry.  No obvious lesions.  Musculoskeletal: Normal gait and station.   Psych: Normal mood and affect. Alert and oriented x3. Judgment and insight is normal.      Assessment & Plan:     29 y.o. female with the following -     1. Migraine without aura and without status migrainosus, not intractable  Chronic problem, improving.  Currently on topiramate 100 mg in the morning and 50 mg at night.  That dose was recently adjusted, headaches already seem improved.    2. Reactive depression  Chronic problem, well-controlled with Zoloft 50 mg.  Taking this regularly.    3. Mild intermittent asthma without complication  Chronic problem, well-controlled.  Exercising again.  Using Ventolin as needed.  Using Singulair for allergies.    4. Chest pain, unspecified type  Chronic problem, now resolved.  Likely due to anxiety.  Improved with Zoloft and therapy.    5. Severe anxiety with panic  Chronic problem, improved with Zoloft and therapy.    6. Erosive gastritis  Chronic problem, requested records from GI today.  She does report that her GI symptoms have been quite good, no issues currently.    I did give her a note for work today stating that she is free to return to work, chronic conditions are stable.      Return if symptoms worsen or fail to improve.    Please note that this dictation was created using voice recognition software. I have made every reasonable attempt to correct obvious errors, but I expect that there are errors of grammar and possibly content that I did not discover before finalizing the note.

## 2020-05-12 NOTE — PROGRESS NOTES
Telemedicine Visit: Established Patient     This encounter was conducted via [employer].   Verbal consent was obtained. Patient's identity was verified.    Subjective:   CC:   Mita Bellamy is a 29 y.o. female presenting for evaluation and management of:    Migraine without aura and without status migrainosus, not intractable  Chronic problem. topiramate has reduced the frequency, however, still having headaches a couple times per week. She is worried this will interfere with her work once she goes back. Was having them nearly daily previously.     Mild intermittent asthma without complication  Chronic problem. Now doing quite well. Asthma well controlled. Exercising again. Pretreating with albuterol.       ROS   Denies any recent fevers or chills. No nausea or vomiting. No chest pains or shortness of breath.     Allergies   Allergen Reactions   • Other Food Hives, Vomiting and Swelling     Pineapple, coconut     • Codeine Vomiting       Current medicines (including changes today)  Current Outpatient Medications   Medication Sig Dispense Refill   • topiramate (TOPAMAX) 50 MG tablet Take 1 po q am and 2 po qhs x 1 week, then increased 2po BID 60 Tab 1   • hydrOXYzine HCl (ATARAX) 25 MG Tab Take 1-2 Tabs by mouth at bedtime as needed (insomnia). 15 Tab 0   • ondansetron (ZOFRAN ODT) 4 MG TABLET DISPERSIBLE Take 1 Tab by mouth every 6 hours as needed for Nausea. 10 Tab 0   • montelukast (SINGULAIR) 10 MG Tab Take 1 Tab by mouth every day. 30 Tab 5   • rizatriptan (MAXALT) 10 MG tablet Take 1 Tab by mouth Once PRN for Migraine for up to 1 dose. 10 Tab 3   • nortriptyline (PAMELOR) 10 MG Cap Take 10 mg by mouth every evening.     • norethindrone (MICRONOR) 0.35 MG tablet Take 1 Tab by mouth every day. 28 Tab 11   • VENTOLIN  (90 Base) MCG/ACT Aero Soln inhalation aerosol INHALE 2 PUFFS PO  QID 8.5 g 1   • sertraline (ZOLOFT) 50 MG Tab Take 1 Tab by mouth every day. (Patient not taking: Reported on 12/19/2019) 30  "Tab 2     No current facility-administered medications for this visit.        Patient Active Problem List    Diagnosis Date Noted   • Migraine without aura and without status migrainosus, not intractable 10/15/2019   • Reactive depression 10/15/2019   • Hiatal hernia 02/12/2019   • Esophagitis, [place] grade A 02/12/2019   • Erosive gastritis 02/12/2019   • Mild intermittent asthma without complication 01/06/2019   • Severe anxiety with panic 01/04/2019   • Chest pain 01/04/2019       Family History   Problem Relation Age of Onset   • Heart Disease Mother    • Heart Attack Mother 40        presumed MI   • Alcohol/Drug Mother    • Heart Disease Sister         heart murmur or arrythmia   • Diabetes Maternal Grandfather    • Cancer Maternal Grandfather         colon   • Alcohol/Drug Father    • Psychiatric Illness Maternal Aunt         bipolar, suicide   • Psychiatric Illness Maternal Uncle    • Diabetes Maternal Grandmother    • Cancer Maternal Grandmother 79        breast       She  has a past medical history of Anxiety, Asthma, and GERD (gastroesophageal reflux disease).  She  has a past surgical history that includes cholecystectomy; appendectomy; and tonsillectomy.       Objective:   Vitals obtained by patient:  Temp: 98.6, Respirations through observation: 15, Height: 5'6\" and Weight: 110lb    Physical Exam:  Constitutional: Alert, no distress, well-groomed.  Skin: No rashes in visible areas.  Eye: Round. Conjunctiva clear, lids normal. No icterus.   ENMT: Lips pink without lesions, good dentition, moist mucous membranes. Phonation normal.  Neck: No masses, no thyromegaly. Moves freely without pain.  CV: Pulse as reported by patient  Respiratory: Unlabored respiratory effort, no cough or audible wheeze  Psych: Alert and oriented x3, normal affect and mood.       Assessment and Plan:   The following treatment plan was discussed:     1. Migraine without aura and without status migrainosus, not " intractable  Chronic problem. Uncontrolled. Plan to increase topamax.   - topiramate (TOPAMAX) 50 MG tablet; Take 1 po q am and 2 po qhs x 1 week, then increased 2po BID  Dispense: 60 Tab; Refill: 1    2. Mild intermittent asthma without complication  Chronic problem. Now well controlled. Denies SOB or chronic cough.       Follow-up: Return in about 6 months (around 11/8/2020), or if symptoms worsen or fail to improve.

## 2020-05-28 ENCOUNTER — OFFICE VISIT (OUTPATIENT)
Dept: MEDICAL GROUP | Facility: MEDICAL CENTER | Age: 30
End: 2020-05-28
Payer: COMMERCIAL

## 2020-05-28 VITALS
WEIGHT: 115.8 LBS | TEMPERATURE: 98 F | DIASTOLIC BLOOD PRESSURE: 62 MMHG | OXYGEN SATURATION: 98 % | BODY MASS INDEX: 18.61 KG/M2 | HEIGHT: 66 IN | SYSTOLIC BLOOD PRESSURE: 110 MMHG | HEART RATE: 57 BPM

## 2020-05-28 DIAGNOSIS — G43.009 MIGRAINE WITHOUT AURA AND WITHOUT STATUS MIGRAINOSUS, NOT INTRACTABLE: ICD-10-CM

## 2020-05-28 DIAGNOSIS — M25.511 ACUTE PAIN OF RIGHT SHOULDER: ICD-10-CM

## 2020-05-28 DIAGNOSIS — R10.84 GENERALIZED ABDOMINAL PAIN: ICD-10-CM

## 2020-05-28 PROCEDURE — 99214 OFFICE O/P EST MOD 30 MIN: CPT | Performed by: FAMILY MEDICINE

## 2020-05-28 RX ORDER — ONDANSETRON 4 MG/1
4 TABLET, ORALLY DISINTEGRATING ORAL EVERY 6 HOURS PRN
Qty: 10 TAB | Refills: 0 | Status: SHIPPED | OUTPATIENT
Start: 2020-05-28 | End: 2020-07-22 | Stop reason: SDUPTHER

## 2020-05-28 RX ORDER — NAPROXEN 500 MG/1
500 TABLET ORAL 2 TIMES DAILY PRN
Qty: 60 TAB | Refills: 0 | Status: SHIPPED
Start: 2020-05-28 | End: 2020-12-31

## 2020-05-28 NOTE — ASSESSMENT & PLAN NOTE
New problem. Started 3 weeks ago.   Seems to be getting worse.   Stopped exercising  Having pain with any movement in the shoulder at this point.   Severely reduced range of motion.   No specific injury that she is aware of but she has been increaseing her exercise recently.   Has treid heat and electrical stim unit.   Has had some swelling  NO improvement with tylenol or tramadol (which she had at home).   Tried lido patch with no improvement.   Pain very severe when laying on her back.

## 2020-05-29 NOTE — PROGRESS NOTES
Subjective:     CC: Diagnoses of Acute pain of right shoulder, Generalized abdominal pain, and Migraine without aura and without status migrainosus, not intractable were pertinent to this visit.    HPI: Patient is a 30 y.o. female established patient who presents today with concern regarding shoulder pain.       Acute pain of right shoulder  New problem. Started 3 weeks ago.   Seems to be getting worse.   Stopped exercising due to the pain.  Having pain with any movement in the shoulder at this point, worst with reaching behind her.   Severely reduced range of motion.   No specific injury that she is aware of but she has been increaseing her exercise recently.   Has tried heat and electrical stim unit.   Has had some swelling  No improvement with tylenol or tramadol (which she had at home).   Tried lido patch with no improvement.   Pain very severe when laying on her back.       Past Medical History:   Diagnosis Date   • Anxiety    • Asthma    • GERD (gastroesophageal reflux disease)        Social History     Tobacco Use   • Smoking status: Never Smoker   • Smokeless tobacco: Never Used   Substance Use Topics   • Alcohol use: No   • Drug use: No       Current Outpatient Medications Ordered in Epic   Medication Sig Dispense Refill   • naproxen (NAPROSYN) 500 MG Tab Take 1 Tab by mouth 2 times a day as needed (pain). 60 Tab 0   • ondansetron (ZOFRAN ODT) 4 MG TABLET DISPERSIBLE Take 1 Tab by mouth every 6 hours as needed for Nausea. 10 Tab 0   • topiramate (TOPAMAX) 50 MG tablet Take 1 po q am and 2 po qhs x 1 week, then increased 2po BID 60 Tab 1   • hydrOXYzine HCl (ATARAX) 25 MG Tab Take 1-2 Tabs by mouth at bedtime as needed (insomnia). 15 Tab 0   • montelukast (SINGULAIR) 10 MG Tab Take 1 Tab by mouth every day. 30 Tab 5   • rizatriptan (MAXALT) 10 MG tablet Take 1 Tab by mouth Once PRN for Migraine for up to 1 dose. 10 Tab 3   • nortriptyline (PAMELOR) 10 MG Cap Take 10 mg by mouth every evening.     •  "norethindrone (MICRONOR) 0.35 MG tablet Take 1 Tab by mouth every day. 28 Tab 11   • VENTOLIN  (90 Base) MCG/ACT Aero Soln inhalation aerosol INHALE 2 PUFFS PO  QID 8.5 g 1   • sertraline (ZOLOFT) 50 MG Tab Take 1 Tab by mouth every day. (Patient not taking: Reported on 12/19/2019) 30 Tab 2     No current Epic-ordered facility-administered medications on file.        Allergies:  Other food and Codeine    Health Maintenance: Completed    ROS:  Pulm: no sob, no cough  CV: no chest pain, no palpitations      Objective:       Exam:  /62 (BP Location: Left arm, Patient Position: Sitting, BP Cuff Size: Adult long)   Pulse (!) 57   Temp 36.7 °C (98 °F) (Temporal)   Ht 1.676 m (5' 6\")   Wt 52.5 kg (115 lb 12.8 oz)   LMP 05/03/2020 (Exact Date)   SpO2 98%   BMI 18.69 kg/m²  Body mass index is 18.69 kg/m².    General: Normal appearing. No distress.  HEAD: NCAT  EYES: conjunctiva clear, lids without ptosis, pupils equal and reactive to light  EARS: ears normal shape and contour.  MOUTH: normal dentition   Neck:  Normal ROM  Pulmonary: Normal effort. Normal respiratory rate.  Cardiovascular: Well perfused. No LE edema  Neurologic: Grossly normal, no focal deficits  Skin: Warm and dry.  No obvious lesions.  Musculoskeletal: Normal gait and station.  Shoulder: Decreased active ROM of the right shoulder especially internal rotation, full strength, empty can test negative, drop arm test negative, Hawkin's negative  Psych: Normal mood and affect. Alert and oriented x3. Judgment and insight is normal.      Assessment & Plan:     30 y.o. female with the following -     1. Acute pain of right shoulder  New problem.  The patient should be starting work soon, very concerned about her shoulder interfering with that.  Referral placed to sports medicine.  Prescription given for naproxen.  - REFERRAL TO SPORTS MEDICINE  - naproxen (NAPROSYN) 500 MG Tab; Take 1 Tab by mouth 2 times a day as needed (pain).  Dispense: 60 " Tab; Refill: 0    2. Generalized abdominal pain  - ondansetron (ZOFRAN ODT) 4 MG TABLET DISPERSIBLE; Take 1 Tab by mouth every 6 hours as needed for Nausea.  Dispense: 10 Tab; Refill: 0    3. Migraine without aura and without status migrainosus, not intractable  - ondansetron (ZOFRAN ODT) 4 MG TABLET DISPERSIBLE; Take 1 Tab by mouth every 6 hours as needed for Nausea.  Dispense: 10 Tab; Refill: 0      Return if symptoms worsen or fail to improve.    Please note that this dictation was created using voice recognition software. I have made every reasonable attempt to correct obvious errors, but I expect that there are errors of grammar and possibly content that I did not discover before finalizing the note.

## 2020-06-18 ENCOUNTER — APPOINTMENT (OUTPATIENT)
Dept: RADIOLOGY | Facility: IMAGING CENTER | Age: 30
End: 2020-06-18
Attending: FAMILY MEDICINE
Payer: COMMERCIAL

## 2020-06-18 ENCOUNTER — OFFICE VISIT (OUTPATIENT)
Dept: MEDICAL GROUP | Facility: CLINIC | Age: 30
End: 2020-06-18
Payer: COMMERCIAL

## 2020-06-18 VITALS
BODY MASS INDEX: 18.61 KG/M2 | TEMPERATURE: 97.3 F | HEART RATE: 63 BPM | SYSTOLIC BLOOD PRESSURE: 110 MMHG | RESPIRATION RATE: 12 BRPM | DIASTOLIC BLOOD PRESSURE: 70 MMHG | HEIGHT: 66 IN | OXYGEN SATURATION: 99 % | WEIGHT: 115.8 LBS

## 2020-06-18 DIAGNOSIS — M54.12 RIGHT CERVICAL RADICULOPATHY: ICD-10-CM

## 2020-06-18 DIAGNOSIS — M95.8 WINGING OF SCAPULA: ICD-10-CM

## 2020-06-18 DIAGNOSIS — R29.898 RIGHT ARM WEAKNESS: ICD-10-CM

## 2020-06-18 PROCEDURE — 99203 OFFICE O/P NEW LOW 30 MIN: CPT | Performed by: FAMILY MEDICINE

## 2020-06-18 PROCEDURE — 72040 X-RAY EXAM NECK SPINE 2-3 VW: CPT | Mod: TC | Performed by: FAMILY MEDICINE

## 2020-06-18 RX ORDER — CYCLOBENZAPRINE HCL 10 MG
10 TABLET ORAL 2 TIMES DAILY PRN
Qty: 14 TAB | Refills: 0 | Status: SHIPPED
Start: 2020-06-18 | End: 2020-10-29
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
## 2020-06-21 DIAGNOSIS — G43.009 MIGRAINE WITHOUT AURA AND WITHOUT STATUS MIGRAINOSUS, NOT INTRACTABLE: ICD-10-CM

## 2020-06-22 ENCOUNTER — TELEPHONE (OUTPATIENT)
Dept: PHYSICAL MEDICINE AND REHAB | Facility: MEDICAL CENTER | Age: 30
End: 2020-06-22

## 2020-06-22 RX ORDER — RIZATRIPTAN BENZOATE 10 MG/1
10 TABLET ORAL
Qty: 10 TAB | Refills: 3 | Status: SHIPPED | OUTPATIENT
Start: 2020-06-22 | End: 2020-10-01 | Stop reason: SDUPTHER

## 2020-06-22 RX ORDER — HYDROXYZINE HYDROCHLORIDE 25 MG/1
25-50 TABLET, FILM COATED ORAL NIGHTLY PRN
Qty: 15 TAB | Refills: 0 | Status: SHIPPED | OUTPATIENT
Start: 2020-06-22 | End: 2021-02-01

## 2020-06-22 NOTE — TELEPHONE ENCOUNTER
----- Message from Tristan Torrse M.D. sent at 6/18/2020  5:05 PM PDT -----  Regarding: FW: Bad scapular winging and c-spine symptoms  Please schedule follow-up with me after the MRI cervical spineDr. Brian  ----- Message -----  From: Sergio Hernandez M.D.  Sent: 6/18/2020   3:02 PM PDT  To: Tristan Torres M.D.  Subject: Bad scapular winging and c-spine symptoms        Olman Hudson,  I ordered a C-spine MRI for this patient.  Hoping that you can get her in soon since her symptoms are pretty severe.    Please feel free to contact me should you have any further questions or concerns.    Respectfully,    Sergio Hernandez M.D.

## 2020-07-10 ENCOUNTER — HOSPITAL ENCOUNTER (OUTPATIENT)
Dept: RADIOLOGY | Facility: MEDICAL CENTER | Age: 30
End: 2020-07-10
Attending: FAMILY MEDICINE
Payer: COMMERCIAL

## 2020-07-10 DIAGNOSIS — R29.898 RIGHT ARM WEAKNESS: ICD-10-CM

## 2020-07-10 DIAGNOSIS — M95.8 WINGING OF SCAPULA: ICD-10-CM

## 2020-07-10 DIAGNOSIS — G43.009 MIGRAINE WITHOUT AURA AND WITHOUT STATUS MIGRAINOSUS, NOT INTRACTABLE: ICD-10-CM

## 2020-07-10 DIAGNOSIS — M54.12 RIGHT CERVICAL RADICULOPATHY: ICD-10-CM

## 2020-07-10 PROCEDURE — 70450 CT HEAD/BRAIN W/O DYE: CPT

## 2020-07-10 PROCEDURE — 72141 MRI NECK SPINE W/O DYE: CPT

## 2020-07-22 DIAGNOSIS — J45.20 MILD INTERMITTENT ASTHMA WITHOUT COMPLICATION: ICD-10-CM

## 2020-07-22 DIAGNOSIS — F41.0 SEVERE ANXIETY WITH PANIC: ICD-10-CM

## 2020-07-22 DIAGNOSIS — F32.9 REACTIVE DEPRESSION: ICD-10-CM

## 2020-07-22 DIAGNOSIS — G43.009 MIGRAINE WITHOUT AURA AND WITHOUT STATUS MIGRAINOSUS, NOT INTRACTABLE: ICD-10-CM

## 2020-07-22 DIAGNOSIS — R10.84 GENERALIZED ABDOMINAL PAIN: ICD-10-CM

## 2020-07-23 RX ORDER — ALBUTEROL SULFATE 90 UG/1
AEROSOL, METERED RESPIRATORY (INHALATION)
Qty: 18 G | Refills: 0 | Status: SHIPPED | OUTPATIENT
Start: 2020-07-23 | End: 2020-10-13 | Stop reason: SDUPTHER

## 2020-07-23 RX ORDER — ONDANSETRON 4 MG/1
4 TABLET, ORALLY DISINTEGRATING ORAL EVERY 6 HOURS PRN
Qty: 10 TAB | Refills: 0 | Status: SHIPPED | OUTPATIENT
Start: 2020-07-23

## 2020-08-03 ENCOUNTER — OFFICE VISIT (OUTPATIENT)
Dept: MEDICAL GROUP | Facility: CLINIC | Age: 30
End: 2020-08-03
Payer: COMMERCIAL

## 2020-08-03 ENCOUNTER — APPOINTMENT (OUTPATIENT)
Dept: RADIOLOGY | Facility: IMAGING CENTER | Age: 30
End: 2020-08-03
Attending: FAMILY MEDICINE
Payer: COMMERCIAL

## 2020-08-03 VITALS — BODY MASS INDEX: 18.61 KG/M2 | WEIGHT: 115.8 LBS | HEIGHT: 66 IN

## 2020-08-03 DIAGNOSIS — M25.511 CHRONIC RIGHT SHOULDER PAIN: ICD-10-CM

## 2020-08-03 DIAGNOSIS — M25.511 ACUTE PAIN OF RIGHT SHOULDER: ICD-10-CM

## 2020-08-03 DIAGNOSIS — M24.411 RECURRENT SHOULDER DISLOCATION, RIGHT: ICD-10-CM

## 2020-08-03 DIAGNOSIS — G89.29 CHRONIC RIGHT SHOULDER PAIN: ICD-10-CM

## 2020-08-03 PROCEDURE — 99214 OFFICE O/P EST MOD 30 MIN: CPT | Performed by: FAMILY MEDICINE

## 2020-08-03 PROCEDURE — 73030 X-RAY EXAM OF SHOULDER: CPT | Mod: TC,RT | Performed by: FAMILY MEDICINE

## 2020-08-03 NOTE — PROGRESS NOTES
"  Subjective       Patient is here to discuss MRI results    Upon further history, at age 8   She sustained a RIGHT traction injury during gymnastics to the RIGHT shoulder  Since that injury she has sustained recurrent RIGHT dislocations with about \"200 self reductions\"    Approximately 1 week after her recent cervical spine MRI study, she started having symptoms in the RIGHT hand in a median nerve distribution, sensation of tightness at the RIGHT second and third MCP joints with a sensation of \"needing to pop the joints\", but unable to do so.  Also, she can't hold onto pencil, trouble gripping, dominant side      Objective     Ht 1.676 m (5' 6\")   Wt 52.5 kg (115 lb 12.8 oz)   BMI 18.69 kg/m²     Spurling's testing is NEGATIVE  No spine tenderness    Shoulder examination demonstrates decreased range of motion of the RIGHT shoulder compared to the left  POSITIVE apprehension on the RIGHT  POSITIVE Cochran's with POSITIVE relocation test on the RIGHT compared to the left  POSITIVE impingement signs (Buck and Neer's on the RIGHT)  POSITIVE grind test on the RIGHT shoulder with painful clicking compared to the left    1. Chronic right shoulder pain  DX-SHOULDER 2+ RIGHT    MR-SHOULDER-W/O RIGHT   2. Recurrent shoulder dislocation, right  REFERRAL TO ORTHOPEDICS     Interestingly, MRI does demonstrate findings at the level C5-C6, consistent with disc herniation, but on her axial images this seems to affect more the LEFT side as opposed to the right  On examination at today's visit she has negative Spurling's testing with normal range of motion of the cervical spine and no tenderness at the cervical spine    Given her new history of remote injury to the RIGHT shoulder with subsequent recurrent dislocations with self reduction (\"over 200 episodes\"), suspect that her symptoms are coming from her shoulder     Referral for orthopedic evaluation for consideration of arthroscopic intervention for her recurrent shoulder " dislocations   (Dorothy)    RIGHT shoulder x-ray performed at TODAY's visit (August 3, 2020) was NORMAL    Check MRI of the RIGHT shoulder to evaluate because of her instability (suspect large labral injury)    She can follow-up after the MRI of her RIGHT shoulder to discuss results or she can discuss with Dr. De La Cruz                   7/10/2020 4:06 PM     HISTORY/REASON FOR EXAM:  Cervical radiculopathy; RIGHT arm weakness with scapular winging and cervical radiculopathy        TECHNIQUE/EXAM DESCRIPTION:  MRI of the cervical spine without contrast.     The study was performed on a [employer] Signa 1.5 Margaux MRI scanner.  T1 sagittal, T2 fast spin-echo sagittal, and gradient echo axial images were obtained of the cervical spine.     COMPARISON: None.     FINDINGS:     Craniocervical junction is intact. Straightening of the cervical spine. Preservation of vertebral body heights and alignment. The marrow signal is within normal limits.     Cervical cord is normal in contour, caliber and signal.     C1-C3: Normal  C3-C4:  Mild disc osteophyte and left uncovertebral osteophytes. Mild to moderate left foraminal narrowing. No significant spinal or right foraminal stenosis.  C4-C5:  Mild disc osteophyte and left uncovertebral osteophytes. Mild left foraminal narrowing. No significant spinal or foraminal stenosis.  C5-C6:  Mild disc osteophyte and left uncovertebral osteophytes. Mild left foraminal narrowing. Borderline spinal stenosis. Right foramen is patent.  C6-C7:  Mild disc osteophyte. No significant spinal or foraminal stenosis.  C7-T1:  Canal and foramina are patent.           IMPRESSION:     C3-C7 disc osteophyte and left uncovertebral arthropathy with left foraminal narrowing as detailed. There is no significant spinal stenosis.    Interpreted in the office today with the patient

## 2020-10-01 DIAGNOSIS — G43.009 MIGRAINE WITHOUT AURA AND WITHOUT STATUS MIGRAINOSUS, NOT INTRACTABLE: ICD-10-CM

## 2020-10-01 RX ORDER — RIZATRIPTAN BENZOATE 10 MG/1
10 TABLET ORAL
Qty: 10 TAB | Refills: 3 | Status: SHIPPED | OUTPATIENT
Start: 2020-10-01 | End: 2021-03-08 | Stop reason: SDUPTHER

## 2020-10-13 DIAGNOSIS — J45.20 MILD INTERMITTENT ASTHMA WITHOUT COMPLICATION: ICD-10-CM

## 2020-10-13 DIAGNOSIS — G43.009 MIGRAINE WITHOUT AURA AND WITHOUT STATUS MIGRAINOSUS, NOT INTRACTABLE: ICD-10-CM

## 2020-10-13 RX ORDER — TOPIRAMATE 50 MG/1
TABLET, FILM COATED ORAL
Qty: 60 TAB | Refills: 1 | Status: SHIPPED | OUTPATIENT
Start: 2020-10-13 | End: 2021-02-12

## 2020-10-13 RX ORDER — ALBUTEROL SULFATE 90 UG/1
AEROSOL, METERED RESPIRATORY (INHALATION)
Qty: 18 G | Refills: 0 | Status: SHIPPED | OUTPATIENT
Start: 2020-10-13 | End: 2021-07-26

## 2020-10-20 ENCOUNTER — HOSPITAL ENCOUNTER (EMERGENCY)
Facility: MEDICAL CENTER | Age: 30
End: 2020-10-20
Attending: EMERGENCY MEDICINE
Payer: COMMERCIAL

## 2020-10-20 ENCOUNTER — APPOINTMENT (OUTPATIENT)
Dept: RADIOLOGY | Facility: MEDICAL CENTER | Age: 30
End: 2020-10-20
Attending: EMERGENCY MEDICINE
Payer: COMMERCIAL

## 2020-10-20 VITALS
SYSTOLIC BLOOD PRESSURE: 116 MMHG | RESPIRATION RATE: 14 BRPM | WEIGHT: 115 LBS | DIASTOLIC BLOOD PRESSURE: 62 MMHG | HEIGHT: 65 IN | HEART RATE: 62 BPM | TEMPERATURE: 97.9 F | BODY MASS INDEX: 19.16 KG/M2 | OXYGEN SATURATION: 99 %

## 2020-10-20 DIAGNOSIS — S02.2XXA CLOSED FRACTURE OF NASAL BONE, INITIAL ENCOUNTER: ICD-10-CM

## 2020-10-20 DIAGNOSIS — S09.90XA CLOSED HEAD INJURY, INITIAL ENCOUNTER: ICD-10-CM

## 2020-10-20 DIAGNOSIS — S00.83XA CONTUSION OF FACE, INITIAL ENCOUNTER: ICD-10-CM
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
## 2020-10-20 DIAGNOSIS — S80.01XA CONTUSION OF RIGHT KNEE, INITIAL ENCOUNTER: ICD-10-CM

## 2020-10-20 PROCEDURE — 70450 CT HEAD/BRAIN W/O DYE: CPT

## 2020-10-20 PROCEDURE — 96375 TX/PRO/DX INJ NEW DRUG ADDON: CPT | Mod: EDC

## 2020-10-20 PROCEDURE — 73564 X-RAY EXAM KNEE 4 OR MORE: CPT | Mod: RT

## 2020-10-20 PROCEDURE — 99284 EMERGENCY DEPT VISIT MOD MDM: CPT | Mod: EDC

## 2020-10-20 PROCEDURE — 700111 HCHG RX REV CODE 636 W/ 250 OVERRIDE (IP): Mod: EDC | Performed by: EMERGENCY MEDICINE

## 2020-10-20 PROCEDURE — 70486 CT MAXILLOFACIAL W/O DYE: CPT

## 2020-10-20 PROCEDURE — 96374 THER/PROPH/DIAG INJ IV PUSH: CPT | Mod: EDC

## 2020-10-20 PROCEDURE — 72125 CT NECK SPINE W/O DYE: CPT

## 2020-10-20 RX ORDER — ONDANSETRON 2 MG/ML
4 INJECTION INTRAMUSCULAR; INTRAVENOUS ONCE
Status: COMPLETED | OUTPATIENT
Start: 2020-10-20 | End: 2020-10-20

## 2020-10-20 RX ORDER — IBUPROFEN 600 MG/1
600 TABLET ORAL EVERY 6 HOURS PRN
Qty: 20 TAB | Refills: 0 | Status: SHIPPED | OUTPATIENT
Start: 2020-10-20 | End: 2020-10-20 | Stop reason: SDUPTHER

## 2020-10-20 RX ORDER — METHOCARBAMOL 500 MG/1
500 TABLET, FILM COATED ORAL EVERY 6 HOURS PRN
Qty: 20 TAB | Refills: 0 | Status: SHIPPED | OUTPATIENT
Start: 2020-10-20 | End: 2020-10-20 | Stop reason: SDUPTHER

## 2020-10-20 RX ORDER — METHOCARBAMOL 500 MG/1
500 TABLET, FILM COATED ORAL EVERY 6 HOURS PRN
Qty: 20 TAB | Refills: 0 | Status: SHIPPED
Start: 2020-10-20 | End: 2020-12-31

## 2020-10-20 RX ORDER — IBUPROFEN 600 MG/1
600 TABLET ORAL EVERY 6 HOURS PRN
Qty: 20 TAB | Refills: 0 | Status: SHIPPED
Start: 2020-10-20 | End: 2020-12-01

## 2020-10-20 RX ADMIN — FENTANYL CITRATE 50 MCG: 50 INJECTION, SOLUTION INTRAMUSCULAR; INTRAVENOUS at 10:04

## 2020-10-20 RX ADMIN — ONDANSETRON 4 MG: 2 INJECTION INTRAMUSCULAR; INTRAVENOUS at 10:04

## 2020-10-20 ASSESSMENT — PAIN SCALES - WONG BAKER: WONGBAKER_NUMERICALRESPONSE: HURTS EVEN MORE

## 2020-10-20 NOTE — DISCHARGE INSTRUCTIONS
Follow-up with primary care this week for reevaluation, medication management and close blood pressure monitoring.  Follow-up with facial fracture, plastic surgery specialist as needed for recommendations following nasal bone fractures.  Call 's office, references emergency department visit and schedule an appointment for follow-up.    Motrin every 6 hours as needed for discomfort.  Robaxin every 6 hours as needed for pain or spasm.    Activity as tolerated.  Expect that you will be sore and stiff probably 1 to 2 days before improvement begins.    Apply ice, 20 minutes of every hour, as needed to face or nose for swelling or discomfort.  Avoid placing ice directly over your eyes.    Return to the emergency department for headache, altered mental status, seizure, focal weakness, for chest pain or shortness of breath, abdominal pain, extremity weakness/swelling/discoloration or other new concerns.
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
## 2020-10-29 ENCOUNTER — OFFICE VISIT (OUTPATIENT)
Dept: MEDICAL GROUP | Facility: MEDICAL CENTER | Age: 30
End: 2020-10-29
Payer: COMMERCIAL

## 2020-10-29 VITALS
WEIGHT: 116.8 LBS | BODY MASS INDEX: 18.77 KG/M2 | DIASTOLIC BLOOD PRESSURE: 62 MMHG | HEART RATE: 64 BPM | SYSTOLIC BLOOD PRESSURE: 100 MMHG | HEIGHT: 66 IN | TEMPERATURE: 97.3 F
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
## 2020-10-29 PROCEDURE — 99214 OFFICE O/P EST MOD 30 MIN: CPT | Performed by: FAMILY MEDICINE

## 2020-10-29 RX ORDER — NORETHINDRONE ACETATE AND ETHINYL ESTRADIOL 1; 20 MG/1; UG/1
TABLET ORAL
COMMUNITY
Start: 2020-10-12 | End: 2020-12-31 | Stop reason: SDUPTHER

## 2020-10-29 RX ORDER — METHOCARBAMOL 750 MG/1
750 TABLET, FILM COATED ORAL 3 TIMES DAILY PRN
Qty: 45 TAB | Refills: 0 | Status: SHIPPED
Start: 2020-10-29 | End: 2020-12-31

## 2020-10-29 ASSESSMENT — PATIENT HEALTH QUESTIONNAIRE - PHQ9
SUM OF ALL RESPONSES TO PHQ9 QUESTIONS 1 AND 2: 2
5. POOR APPETITE OR OVEREATING: NOT AT ALL
8. MOVING OR SPEAKING SO SLOWLY THAT OTHER PEOPLE COULD HAVE NOTICED. OR THE OPPOSITE, BEING SO FIGETY OR RESTLESS THAT YOU HAVE BEEN MOVING AROUND A LOT MORE THAN USUAL: NOT AT ALL
6. FEELING BAD ABOUT YOURSELF - OR THAT YOU ARE A FAILURE OR HAVE LET YOURSELF OR YOUR FAMILY DOWN: SEVERAL DAYS
4. FEELING TIRED OR HAVING LITTLE ENERGY: SEVERAL DAYS
3. TROUBLE FALLING OR STAYING ASLEEP OR SLEEPING TOO MUCH: SEVERAL DAYS
5. POOR APPETITE OR OVEREATING: SEVERAL DAYS
9. THOUGHTS THAT YOU WOULD BE BETTER OFF DEAD, OR OF HURTING YOURSELF: NOT AT ALL
7. TROUBLE CONCENTRATING ON THINGS, SUCH AS READING THE NEWSPAPER OR WATCHING TELEVISION: NOT AT ALL
3. TROUBLE FALLING OR STAYING ASLEEP OR SLEEPING TOO MUCH: SEVERAL DAYS
SUM OF ALL RESPONSES TO PHQ QUESTIONS 1-9: 4
1. LITTLE INTEREST OR PLEASURE IN DOING THINGS: SEVERAL DAYS
4. FEELING TIRED OR HAVING LITTLE ENERGY: SEVERAL DAYS
2. FEELING DOWN, DEPRESSED, IRRITABLE, OR HOPELESS: SEVERAL DAYS
6. FEELING BAD ABOUT YOURSELF - OR THAT YOU ARE A FAILURE OR HAVE LET YOURSELF OR YOUR FAMILY DOWN: NOT AL ALL
1. LITTLE INTEREST OR PLEASURE IN DOING THINGS: SEVERAL DAYS
8. MOVING OR SPEAKING SO SLOWLY THAT OTHER PEOPLE COULD HAVE NOTICED. OR THE OPPOSITE, BEING SO FIGETY OR RESTLESS THAT YOU HAVE BEEN MOVING AROUND A LOT MORE THAN USUAL: NOT AT ALL
2. FEELING DOWN, DEPRESSED, IRRITABLE, OR HOPELESS: SEVERAL DAYS
SUM OF ALL RESPONSES TO PHQ9 QUESTIONS 1 AND 2: 2
9. THOUGHTS THAT YOU WOULD BE BETTER OFF DEAD, OR OF HURTING YOURSELF: NOT AT ALL
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
## 2020-11-03 DIAGNOSIS — J45.20 MILD INTERMITTENT ASTHMA WITHOUT COMPLICATION: ICD-10-CM

## 2020-11-03 RX ORDER — MONTELUKAST SODIUM 10 MG/1
TABLET ORAL
Qty: 90 TAB | Refills: 4 | Status: SHIPPED | OUTPATIENT
Start: 2020-11-03 | End: 2021-06-06 | Stop reason: SDUPTHER

## 2020-12-01 ENCOUNTER — TELEMEDICINE (OUTPATIENT)
Dept: MEDICAL GROUP | Facility: MEDICAL CENTER | Age: 30
End: 2020-12-01
Payer: COMMERCIAL

## 2020-12-01 VITALS — HEIGHT: 66 IN | BODY MASS INDEX: 18.8 KG/M2 | WEIGHT: 117 LBS | TEMPERATURE: 98.6 F

## 2020-12-01 DIAGNOSIS — K13.0 LIP LESION: ICD-10-CM

## 2020-12-01 PROCEDURE — 99214 OFFICE O/P EST MOD 30 MIN: CPT | Mod: 95,CR | Performed by: FAMILY MEDICINE

## 2020-12-01 RX ORDER — METRONIDAZOLE 500 MG/1
TABLET ORAL
COMMUNITY
Start: 2020-11-23 | End: 2021-04-09

## 2020-12-01 RX ORDER — VALACYCLOVIR HYDROCHLORIDE 1 G/1
2000 TABLET, FILM COATED ORAL 2 TIMES DAILY
Qty: 20 TAB | Refills: 0 | Status: SHIPPED | OUTPATIENT
Start: 2020-12-01 | End: 2021-02-26

## 2020-12-01 NOTE — ASSESSMENT & PLAN NOTE
Has had cold sores int he past  Has had current one for the pst 2 months  Has had   Uses abreva and cold com  Blistered.   Usually in nose.
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
## 2020-12-31 ENCOUNTER — OFFICE VISIT (OUTPATIENT)
Dept: MEDICAL GROUP | Facility: MEDICAL CENTER | Age: 30
End: 2020-12-31
Payer: COMMERCIAL

## 2020-12-31 VITALS
WEIGHT: 122 LBS | OXYGEN SATURATION: 97 % | DIASTOLIC BLOOD PRESSURE: 70 MMHG | HEIGHT: 66 IN | HEART RATE: 65 BPM | TEMPERATURE: 97.6 F | SYSTOLIC BLOOD PRESSURE: 114 MMHG | BODY MASS INDEX: 19.61 KG/M2

## 2020-12-31 DIAGNOSIS — R61 NIGHT SWEATS: ICD-10-CM

## 2020-12-31 DIAGNOSIS — Z30.09 FAMILY PLANNING: ICD-10-CM

## 2020-12-31 DIAGNOSIS — G43.009 MIGRAINE WITHOUT AURA AND WITHOUT STATUS MIGRAINOSUS, NOT INTRACTABLE: ICD-10-CM

## 2020-12-31 PROCEDURE — 99214 OFFICE O/P EST MOD 30 MIN: CPT | Performed by: FAMILY MEDICINE

## 2020-12-31 RX ORDER — KETOROLAC TROMETHAMINE 30 MG/ML
60 INJECTION, SOLUTION INTRAMUSCULAR; INTRAVENOUS ONCE
Status: COMPLETED | OUTPATIENT
Start: 2020-12-31 | End: 2020-12-31

## 2020-12-31 RX ORDER — NORETHINDRONE ACETATE AND ETHINYL ESTRADIOL 1; 20 MG/1; UG/1
1 TABLET ORAL DAILY
Qty: 84 TAB | Refills: 3 | Status: SHIPPED | OUTPATIENT
Start: 2020-12-31 | End: 2021-06-06 | Stop reason: SDUPTHER

## 2020-12-31 RX ADMIN — KETOROLAC TROMETHAMINE 60 MG: 30 INJECTION, SOLUTION INTRAMUSCULAR; INTRAVENOUS at 10:50

## 2020-12-31 NOTE — ASSESSMENT & PLAN NOTE
Ne wproblem.   X 3 weeks  Also sweating during the day  Currently on zoloft  She was on OCPs, missed the last 3 days because she ran out.   Woud like refill.   No cough, no fever,   No weight loss  + HA

## 2021-01-04 NOTE — PROGRESS NOTES
Subjective:     CC: Diagnoses of Night sweats, Migraine without aura and without status migrainosus, not intractable, and Family planning were pertinent to this visit.    HPI: Patient is a 30 y.o. female established patient who presents today with the following concerns.       Night sweats  New problem.   X 3 weeks  Also sweating during the day  Currently on zoloft 50mg  She was on OCPs, missed the last 3 days because she ran out.   Woud like refill.   No cough, no fever, no weight loss  + HA    Migraine without aura and without status migrainosus,  intractable  Chronic problem. Has been having an intractable migraine for the past 4 days  No improvement with maxalt.  Currently taking topiramate, Migraines had been improving although now worse.       Past Medical History:   Diagnosis Date   • Anxiety    • Asthma    • GERD (gastroesophageal reflux disease)        Social History     Tobacco Use   • Smoking status: Never Smoker   • Smokeless tobacco: Never Used   Substance Use Topics   • Alcohol use: No   • Drug use: No       Current Outpatient Medications Ordered in Epic   Medication Sig Dispense Refill   • JUNEL 1/20 1-20 MG-MCG per tablet Take 1 Tab by mouth every day. 84 Tab 3   • metroNIDAZOLE (FLAGYL) 500 MG Tab      • montelukast (SINGULAIR) 10 MG Tab TAKE ONE TABLET BY MOUTH DAILY 90 Tab 4   • topiramate (TOPAMAX) 50 MG tablet Take 1 po q am and 2 po qhs x 1 week, then increased 2po BID 60 Tab 1   • albuterol 108 (90 Base) MCG/ACT Aero Soln inhalation aerosol INHALE TWO PUFFS BY MOUTH FOUR TIMES A DAY 18 g 0   • rizatriptan (MAXALT) 10 MG tablet Take 1 Tab by mouth Once PRN for Migraine for up to 1 dose. 10 Tab 3   • sertraline (ZOLOFT) 50 MG Tab Take 1 Tab by mouth every day. 30 Tab 5   • ondansetron (ZOFRAN ODT) 4 MG TABLET DISPERSIBLE Take 1 Tab by mouth every 6 hours as needed for Nausea. 10 Tab 0   • hydrOXYzine HCl (ATARAX) 25 MG Tab Take 1-2 Tabs by mouth at bedtime as needed (insomnia). 15 Tab 0     No  "current [facility]-ordered facility-administered medications on file.        Allergies:  Other food and Codeine    Health Maintenance: Completed    ROS:  Gen: no fevers/chill, no changes in weight  Eyes: no changes in vision  ENT: no sore throat, no hearing loss, no bloody nose  Pulm: no sob, no cough  CV: no chest pain, no palpitations  GI: no nausea/vomiting, no diarrhea  : no dysuria  MSk: no myalgias  Skin: no rash  Neuro: no headaches, no numbness/tingling  Heme/Lymph: no easy bruising      Objective:       Exam:  /70 (BP Location: Right arm, Patient Position: Sitting, BP Cuff Size: Adult long)   Pulse 65   Temp 36.4 °C (97.6 °F) (Temporal)   Ht 1.676 m (5' 6\")   Wt 55.3 kg (122 lb)   LMP 12/26/2020 (Exact Date)   SpO2 97%   BMI 19.69 kg/m²  Body mass index is 19.69 kg/m².    General: Normal appearing. No distress.  HEAD: NCAT  EYES: conjunctiva clear, lids without ptosis, pupils equal and reactive to light  EARS: ears normal shape and contour.  MOUTH: normal dentition   Neck:  Normal ROM  Pulmonary: CTAB, no W/R/R. Normal effort. Normal respiratory rate.  Cardiovascular: RRR, no M/R/G. Well perfused. No LE edema  Neurologic: Grossly normal, no focal deficits  Skin: Warm and dry.  No obvious lesions.  Musculoskeletal: Normal gait and station.   Psych: Normal mood and affect. Alert and oriented x3. Judgment and insight is normal.    Labs: 1/11/19 Results reviewed and discussed with the patient, questions answered.    Assessment & Plan:     30 y.o. female with the following -     1. Night sweats  New problem. Ordered the following labs.   Possibly due to SSRI.   No other red flag symptoms  - Comp Metabolic Panel; Future  - Lipid Profile; Future  - TSH WITH REFLEX TO FT4; Future  - CBC WITH DIFFERENTIAL; Future  - Quantiferon Gold TB (PPD); Future    2. Migraine without aura and without status migrainosus, intractable  Current migraine has been going  On x 4 days. Possibly due to lack of sleep. Gave " toradol IM today. Using maxalt. Plan to f/u if no improvement.   - ketorolac (TORADOL) injection 60 mg    3. Family planning  - JUNEL 1/20 1-20 MG-MCG per tablet; Take 1 Tab by mouth every day.  Dispense: 84 Tab; Refill: 3      No follow-ups on file.    Please note that this dictation was created using voice recognition software. I have made every reasonable attempt to correct obvious errors, but I expect that there are errors of grammar and possibly content that I did not discover before finalizing the note.

## 2021-01-25 ENCOUNTER — TELEMEDICINE (OUTPATIENT)
Dept: MEDICAL GROUP | Facility: MEDICAL CENTER | Age: 31
End: 2021-01-25

## 2021-01-25 VITALS — WEIGHT: 120 LBS | TEMPERATURE: 98.6 F | BODY MASS INDEX: 19.29 KG/M2 | HEIGHT: 66 IN

## 2021-01-25 DIAGNOSIS — G43.009 MIGRAINE WITHOUT AURA AND WITHOUT STATUS MIGRAINOSUS, NOT INTRACTABLE: ICD-10-CM

## 2021-01-25 DIAGNOSIS — R61 NIGHT SWEATS: ICD-10-CM

## 2021-01-25 PROCEDURE — 99213 OFFICE O/P EST LOW 20 MIN: CPT | Mod: 95,CR | Performed by: FAMILY MEDICINE

## 2021-01-25 NOTE — ASSESSMENT & PLAN NOTE
Chronic problem. Continues to have night sweats. Headaches improved.   Denies any weight loss.   No fevers during the day.   Labs have not yet been resulted.
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
## 2021-01-30 DIAGNOSIS — G43.009 MIGRAINE WITHOUT AURA AND WITHOUT STATUS MIGRAINOSUS, NOT INTRACTABLE: ICD-10-CM

## 2021-02-01 RX ORDER — HYDROXYZINE HYDROCHLORIDE 25 MG/1
TABLET, FILM COATED ORAL
Qty: 15 TAB | Refills: 0 | Status: SHIPPED | OUTPATIENT
Start: 2021-02-01

## 2021-02-25 DIAGNOSIS — K13.0 LIP LESION: ICD-10-CM

## 2021-03-01 ENCOUNTER — NON-PROVIDER VISIT (OUTPATIENT)
Dept: MEDICAL GROUP | Facility: MEDICAL CENTER | Age: 31
End: 2021-03-01
Payer: OTHER GOVERNMENT

## 2021-03-01 RX ORDER — VALACYCLOVIR HYDROCHLORIDE 1 G/1
2000 TABLET, FILM COATED ORAL 2 TIMES DAILY
Qty: 20 TABLET | Refills: 0 | Status: SHIPPED | OUTPATIENT
Start: 2021-03-01 | End: 2021-05-26 | Stop reason: SDUPTHER

## 2021-03-01 NOTE — NON-PROVIDER
Mita Bellamy is a 30 y.o. female here for a non-provider visit for Spirometry Test     If abnormal was an in office provider notified today (if so, indicate provider)? Yes  Routed to PCP? Yes    Spirometry test conducted, results given to PCP, form filled out for patient for medical release due to asthma.

## 2021-03-08 DIAGNOSIS — G43.009 MIGRAINE WITHOUT AURA AND WITHOUT STATUS MIGRAINOSUS, NOT INTRACTABLE: ICD-10-CM

## 2021-03-08 DIAGNOSIS — F32.9 REACTIVE DEPRESSION: ICD-10-CM

## 2021-03-08 DIAGNOSIS — F41.0 SEVERE ANXIETY WITH PANIC: ICD-10-CM

## 2021-03-08 RX ORDER — RIZATRIPTAN BENZOATE 10 MG/1
10 TABLET ORAL
Qty: 10 TABLET | Refills: 3 | Status: SHIPPED | OUTPATIENT
Start: 2021-03-08

## 2021-03-09 ENCOUNTER — TELEPHONE (OUTPATIENT)
Dept: MEDICAL GROUP | Facility: MEDICAL CENTER | Age: 31
End: 2021-03-09

## 2021-03-09 NOTE — TELEPHONE ENCOUNTER
Patient came in for MA visit for a spirometry and Dr. Chau signed the paperwork which was faxed and given to patient on 03/01/21.

## 2021-04-09 ENCOUNTER — OFFICE VISIT (OUTPATIENT)
Dept: MEDICAL GROUP | Facility: MEDICAL CENTER | Age: 31
End: 2021-04-09
Payer: OTHER GOVERNMENT

## 2021-04-09 VITALS
DIASTOLIC BLOOD PRESSURE: 68 MMHG | TEMPERATURE: 97.8 F | OXYGEN SATURATION: 94 % | WEIGHT: 117.6 LBS | BODY MASS INDEX: 18.9 KG/M2 | SYSTOLIC BLOOD PRESSURE: 108 MMHG | HEIGHT: 66 IN | HEART RATE: 72 BPM

## 2021-04-09 DIAGNOSIS — N39.0 RECURRENT UTI: ICD-10-CM

## 2021-04-09 DIAGNOSIS — M25.561 ACUTE PAIN OF RIGHT KNEE: ICD-10-CM

## 2021-04-09 DIAGNOSIS — G43.009 MIGRAINE WITHOUT AURA AND WITHOUT STATUS MIGRAINOSUS, NOT INTRACTABLE: ICD-10-CM

## 2021-04-09 DIAGNOSIS — Z30.09 FAMILY PLANNING: ICD-10-CM

## 2021-04-09 LAB
APPEARANCE UR: NORMAL
BILIRUB UR STRIP-MCNC: NEGATIVE MG/DL
COLOR UR AUTO: YELLOW
GLUCOSE UR STRIP.AUTO-MCNC: NEGATIVE MG/DL
KETONES UR STRIP.AUTO-MCNC: NEGATIVE MG/DL
LEUKOCYTE ESTERASE UR QL STRIP.AUTO: NEGATIVE
NITRITE UR QL STRIP.AUTO: NEGATIVE
PH UR STRIP.AUTO: 5.5 [PH] (ref 5–8)
PROT UR QL STRIP: NEGATIVE MG/DL
RBC UR QL AUTO: NEGATIVE
SP GR UR STRIP.AUTO: >=1.03
UROBILINOGEN UR STRIP-MCNC: NORMAL MG/DL

## 2021-04-09 PROCEDURE — 81002 URINALYSIS NONAUTO W/O SCOPE: CPT | Performed by: FAMILY MEDICINE

## 2021-04-09 PROCEDURE — 99214 OFFICE O/P EST MOD 30 MIN: CPT | Performed by: FAMILY MEDICINE

## 2021-04-09 NOTE — ASSESSMENT & PLAN NOTE
Patient requesting referral for OB/GYN.  She already sees Dr. Gamez but needs referral due to .  She is hoping to get a tubal ligation.

## 2021-04-09 NOTE — ASSESSMENT & PLAN NOTE
New problem.  The patient reports significant pain in the superior medial aspect of the right knee, making it impossible for her to bend her knee.  She did discuss this with sports medicine, Dr. Hernandez who recommends that she see orthopedic surgery.  She is requesting referral.

## 2021-04-09 NOTE — ASSESSMENT & PLAN NOTE
Chronic problem. Migraines had been improving with starting topamax. However, seem to be getting worse again. Likely due to stress.

## 2021-04-09 NOTE — PROGRESS NOTES
Subjective:     CC: Diagnoses of Acute pain of right knee, Recurrent UTI, Family planning, and Migraine without aura and without status migrainosus, not intractable were pertinent to this visit.    HPI: Patient is a 30 y.o. female established patient who presents today to follow-up on migraines, also requesting referral to Ortho and OB/GYN.      Migraine without aura and without status migrainosus, not intractable  Chronic problem. Migraines had been improving with starting topamax. However, seem to be getting worse again. Likely due to stress.       Family planning  Patient requesting referral for OB/GYN.  She already sees Dr. Gamez but needs referral due to .  She is hoping to get a tubal ligation.    Acute pain of right knee  New problem.  The patient reports significant pain in the superior medial aspect of the right knee, making it impossible for her to bend her knee.  She did discuss this with sports medicine, Dr. Hernandez who recommends that she see orthopedic surgery.  She is requesting referral.      Past Medical History:   Diagnosis Date   • Anxiety    • Asthma    • GERD (gastroesophageal reflux disease)        Social History     Tobacco Use   • Smoking status: Never Smoker   • Smokeless tobacco: Never Used   Substance Use Topics   • Alcohol use: No   • Drug use: No       Current Outpatient Medications Ordered in Epic   Medication Sig Dispense Refill   • sertraline (ZOLOFT) 50 MG Tab Take 1 tablet by mouth every day. 30 tablet 5   • rizatriptan (MAXALT) 10 MG tablet Take 1 tablet by mouth one time as needed for Migraine for up to 1 dose. 10 tablet 3   • topiramate (TOPAMAX) 50 MG tablet TAKE ONE TABLET BY MOUTH EVERY MORNING AND TAKE TWO TABLETS EVERY NIGHT AT BEDTIME FOR 1 WEEK THEN INCRESE TO TWO TABLETS TWICE A  tablet 1   • hydrOXYzine HCl (ATARAX) 25 MG Tab TAKE ONE TO TWO TABLETS BY MOUTH AT BEDTIME AS NEEDED FOR INSOMNIA 15 Tab 0   • JUNEL 1/20 1-20 MG-MCG per tablet Take 1 Tab by mouth  "every day. 84 Tab 3   • montelukast (SINGULAIR) 10 MG Tab TAKE ONE TABLET BY MOUTH DAILY 90 Tab 4   • albuterol 108 (90 Base) MCG/ACT Aero Soln inhalation aerosol INHALE TWO PUFFS BY MOUTH FOUR TIMES A DAY 18 g 0   • ondansetron (ZOFRAN ODT) 4 MG TABLET DISPERSIBLE Take 1 Tab by mouth every 6 hours as needed for Nausea. 10 Tab 0     No current Epic-ordered facility-administered medications on file.       Allergies:  Other food and Codeine    Health Maintenance: Completed    ROS:  Pulm: no sob, no cough  CV: no chest pain, no palpitations      Objective:       Exam:  /68 (BP Location: Right arm, Patient Position: Sitting, BP Cuff Size: Adult long)   Pulse 72   Temp 36.6 °C (97.8 °F) (Temporal)   Ht 1.676 m (5' 6\")   Wt 53.3 kg (117 lb 9.6 oz)   LMP 03/14/2021   SpO2 94%   BMI 18.98 kg/m²  Body mass index is 18.98 kg/m².    General: Normal appearing. No distress.  HEAD: NCAT  EYES: conjunctiva clear, lids without ptosis, pupils equal and reactive to light  EARS: ears normal shape and contour.  MOUTH: normal dentition   Neck:  Normal ROM  Pulmonary: Normal effort. Normal respiratory rate.  Cardiovascular: Well perfused. No LE edema  Neurologic: Grossly normal, no focal deficits  Skin: Warm and dry.  No obvious lesions.  Musculoskeletal: Normal gait and station.   Psych: Normal mood and affect. Alert and oriented x3. Judgment and insight is normal.     Labs: 10/20/20 Results reviewed and discussed with the patient, questions answered.    Assessment & Plan:     30 y.o. female with the following -     1. Acute pain of right knee  - REFERRAL TO ORTHOPEDICS    2. Recurrent UTI  Chronic problem.  Concerned she may have symptoms today.  Urinalysis was completely normal.  She is going to schedule appoint with OB/GYN as she has had this issue in the past.  - REFERRAL TO OB/GYN  - POCT Urinalysis    3. Family planning  Referral placed to OB/GYN, patient open to getting tubal ligation.  - REFERRAL TO OB/GYN    4. " Migraine without aura and without status migrainosus, not intractable  Chronic problem, uncontrolled.  Likely due to triggers including stress and lack of sleep.  We did discuss increasing the Topamax, plan to hold off as hopefully these triggers will pass.  He does have refills in place for Maxalt.  No follow-ups on file.    Please note that this dictation was created using voice recognition software. I have made every reasonable attempt to correct obvious errors, but I expect that there are errors of grammar and possibly content that I did not discover before finalizing the note.

## 2021-05-26 DIAGNOSIS — K13.0 LIP LESION: ICD-10-CM

## 2021-05-26 NOTE — TELEPHONE ENCOUNTER
----- Message from Yeni Bhatt sent at 5/25/2021  7:42 PM PDT -----  Regarding: FW: med refill  Contact: [phone]    ----- Message -----  From: Mita Bellamy  Sent: 5/21/2021   8:27 AM PDT  To: Leon Farmer  Subject: med refill                                       Topic: Referral Status    Is there any way i could get a refill for my valacyclovir please

## 2021-05-27 RX ORDER — VALACYCLOVIR HYDROCHLORIDE 1 G/1
2000 TABLET, FILM COATED ORAL 2 TIMES DAILY
Qty: 20 TABLET | Refills: 0 | Status: SHIPPED | OUTPATIENT
Start: 2021-05-27 | End: 2021-05-28

## 2021-06-14 ENCOUNTER — HOSPITAL ENCOUNTER (OUTPATIENT)
Dept: RADIOLOGY | Facility: MEDICAL CENTER | Age: 31
End: 2021-06-14
Payer: OTHER GOVERNMENT

## 2022-01-28 ENCOUNTER — APPOINTMENT (OUTPATIENT)
Dept: RADIOLOGY | Facility: MEDICAL CENTER | Age: 32
End: 2022-01-28
Attending: EMERGENCY MEDICINE
Payer: OTHER GOVERNMENT

## 2022-01-28 ENCOUNTER — HOSPITAL ENCOUNTER (EMERGENCY)
Facility: MEDICAL CENTER | Age: 32
End: 2022-01-29
Attending: EMERGENCY MEDICINE
Payer: OTHER GOVERNMENT

## 2022-01-28 VITALS
SYSTOLIC BLOOD PRESSURE: 108 MMHG | BODY MASS INDEX: 21.38 KG/M2 | OXYGEN SATURATION: 98 % | TEMPERATURE: 97.2 F | DIASTOLIC BLOOD PRESSURE: 58 MMHG | WEIGHT: 133 LBS | HEART RATE: 66 BPM | HEIGHT: 66 IN | RESPIRATION RATE: 17 BRPM

## 2022-01-28 DIAGNOSIS — R10.2 PELVIC PAIN AFFECTING PREGNANCY IN SECOND TRIMESTER, ANTEPARTUM: ICD-10-CM

## 2022-01-28 DIAGNOSIS — O26.892 PELVIC PAIN AFFECTING PREGNANCY IN SECOND TRIMESTER, ANTEPARTUM: ICD-10-CM

## 2022-01-28 LAB
ALBUMIN SERPL BCP-MCNC: 3.7 G/DL (ref 3.2–4.9)
ALBUMIN/GLOB SERPL: 1.4 G/DL
ALP SERPL-CCNC: 44 U/L (ref 30–99)
ALT SERPL-CCNC: 7 U/L (ref 2–50)
ANION GAP SERPL CALC-SCNC: 13 MMOL/L (ref 7–16)
APPEARANCE UR: ABNORMAL
AST SERPL-CCNC: 14 U/L (ref 12–45)
B-HCG SERPL-ACNC: 6135 MIU/ML (ref 0–5)
BACTERIA #/AREA URNS HPF: NEGATIVE /HPF
BASOPHILS # BLD AUTO: 0.5 % (ref 0–1.8)
BASOPHILS # BLD: 0.06 K/UL (ref 0–0.12)
BILIRUB SERPL-MCNC: 0.3 MG/DL (ref 0.1–1.5)
BILIRUB UR QL STRIP.AUTO: NEGATIVE
BUN SERPL-MCNC: 11 MG/DL (ref 8–22)
CALCIUM SERPL-MCNC: 8.6 MG/DL (ref 8.5–10.5)
CHLORIDE SERPL-SCNC: 104 MMOL/L (ref 96–112)
CO2 SERPL-SCNC: 19 MMOL/L (ref 20–33)
COLOR UR: YELLOW
CREAT SERPL-MCNC: 0.5 MG/DL (ref 0.5–1.4)
EOSINOPHIL # BLD AUTO: 0.27 K/UL (ref 0–0.51)
EOSINOPHIL NFR BLD: 2.4 % (ref 0–6.9)
EPI CELLS #/AREA URNS HPF: ABNORMAL /HPF
ERYTHROCYTE [DISTWIDTH] IN BLOOD BY AUTOMATED COUNT: 40.6 FL (ref 35.9–50)
GLOBULIN SER CALC-MCNC: 2.7 G/DL (ref 1.9–3.5)
GLUCOSE SERPL-MCNC: 92 MG/DL (ref 65–99)
GLUCOSE UR STRIP.AUTO-MCNC: NEGATIVE MG/DL
HCT VFR BLD AUTO: 34.6 % (ref 37–47)
HGB BLD-MCNC: 11.9 G/DL (ref 12–16)
HYALINE CASTS #/AREA URNS LPF: ABNORMAL /LPF
IMM GRANULOCYTES # BLD AUTO: 0.11 K/UL (ref 0–0.11)
IMM GRANULOCYTES NFR BLD AUTO: 1 % (ref 0–0.9)
KETONES UR STRIP.AUTO-MCNC: NEGATIVE MG/DL
LEUKOCYTE ESTERASE UR QL STRIP.AUTO: ABNORMAL
LIPASE SERPL-CCNC: 26 U/L (ref 11–82)
LYMPHOCYTES # BLD AUTO: 1.92 K/UL (ref 1–4.8)
LYMPHOCYTES NFR BLD: 17.2 % (ref 22–41)
MCH RBC QN AUTO: 29.2 PG (ref 27–33)
MCHC RBC AUTO-ENTMCNC: 34.4 G/DL (ref 33.6–35)
MCV RBC AUTO: 85 FL (ref 81.4–97.8)
MICRO URNS: ABNORMAL
MONOCYTES # BLD AUTO: 0.72 K/UL (ref 0–0.85)
MONOCYTES NFR BLD AUTO: 6.5 % (ref 0–13.4)
NEUTROPHILS # BLD AUTO: 8.07 K/UL (ref 2–7.15)
NEUTROPHILS NFR BLD: 72.4 % (ref 44–72)
NITRITE UR QL STRIP.AUTO: NEGATIVE
NRBC # BLD AUTO: 0 K/UL
NRBC BLD-RTO: 0 /100 WBC
PH UR STRIP.AUTO: 6.5 [PH] (ref 5–8)
PLATELET # BLD AUTO: 229 K/UL (ref 164–446)
PMV BLD AUTO: 11.4 FL (ref 9–12.9)
POTASSIUM SERPL-SCNC: 3.6 MMOL/L (ref 3.6–5.5)
PROT SERPL-MCNC: 6.4 G/DL (ref 6–8.2)
PROT UR QL STRIP: NEGATIVE MG/DL
RBC # BLD AUTO: 4.07 M/UL (ref 4.2–5.4)
RBC # URNS HPF: ABNORMAL /HPF
RBC UR QL AUTO: NEGATIVE
SODIUM SERPL-SCNC: 136 MMOL/L (ref 135–145)
SP GR UR STRIP.AUTO: 1.02
UROBILINOGEN UR STRIP.AUTO-MCNC: 0.2 MG/DL
WBC # BLD AUTO: 11.2 K/UL (ref 4.8–10.8)
WBC #/AREA URNS HPF: ABNORMAL /HPF

## 2022-01-28 PROCEDURE — 80053 COMPREHEN METABOLIC PANEL: CPT

## 2022-01-28 PROCEDURE — 87086 URINE CULTURE/COLONY COUNT: CPT

## 2022-01-28 PROCEDURE — 85025 COMPLETE CBC W/AUTO DIFF WBC: CPT

## 2022-01-28 PROCEDURE — 83690 ASSAY OF LIPASE: CPT

## 2022-01-28 PROCEDURE — 76815 OB US LIMITED FETUS(S): CPT

## 2022-01-28 PROCEDURE — 84702 CHORIONIC GONADOTROPIN TEST: CPT

## 2022-01-28 PROCEDURE — 36415 COLL VENOUS BLD VENIPUNCTURE: CPT

## 2022-01-28 PROCEDURE — 81001 URINALYSIS AUTO W/SCOPE: CPT

## 2022-01-29 PROCEDURE — 99284 EMERGENCY DEPT VISIT MOD MDM: CPT

## 2022-01-29 NOTE — ED NOTES
AVS dicussed with pt and all questions and concerns addressed at this time. Pt ambulated to lobby with family with steady gait.

## 2022-01-29 NOTE — ED TRIAGE NOTES
"Chief Complaint   Patient presents with   • Abdominal Pain     cramping started about 3 weeks ago but became worse today around 1100   • N/V     for 1.5 hour     Pt BIB EMS for above complaint. Pt is 19 weeks pregnant and started having cramps about 3 weeks ago and then today around 1100 pain increased significantly. Pt states she attempted to have a BM and felt pressure in her vagina and stopped. Pt denies any vaginal bleeding at this time. Pt given 15 fent and 8mg zofran PTA.     /74   Pulse 77   Temp 36.6 °C (97.8 °F) (Oral)   Resp 18   Ht 1.676 m (5' 6\")   Wt 60.3 kg (133 lb)   SpO2 96%   BMI 21.47 kg/m²     "

## 2022-01-29 NOTE — ED PROVIDER NOTES
ED Provider Note    CHIEF COMPLAINT  Chief Complaint   Patient presents with   • Abdominal Pain     cramping started about 3 weeks ago but became worse today around 1100   • N/V     for 1.5 hour       HPI  Mita Bellamy is a 31 y.o. female who presents for evaluation of pelvic pain in the setting of pregnancy.  G7, P2 at 19 weeks, had acute onset of lower abdominal pelvic cramping, felt as though she had to have a bowel movement, she sat on the toilet and push but was unable to have a bowel movement but felt pressure in her vagina as she was pushing.  No loss of fluids.  No vaginal bleeding.  No symptoms currently, states they seem to come and go.  Her most recent pregnancy was complicated, she reports that she had fetal monitoring at her obstetrician's office every other day for 18 weeks, ultimately her child was born at 38 weeks.  She had no burning or blood with urination but has been urinating with increased frequency.  No fever.  No chest pain or shortness of breath.  No other complaints.    REVIEW OF SYSTEMS  Negative for fever, rash, chest pain, dyspnea, nausea, vomiting, diarrhea, headache, focal weakness, focal numbness, focal tingling, back pain. All other systems are negative.     PAST MEDICAL HISTORY  Past Medical History:   Diagnosis Date   • Anxiety    • Asthma    • GERD (gastroesophageal reflux disease)        FAMILY HISTORY  Family History   Problem Relation Age of Onset   • Heart Disease Mother    • Heart Attack Mother 40        presumed MI   • Alcohol/Drug Mother    • Heart Disease Sister         heart murmur or arrythmia   • Diabetes Maternal Grandfather    • Cancer Maternal Grandfather         colon   • Alcohol/Drug Father    • Psychiatric Illness Maternal Aunt         bipolar, suicide   • Psychiatric Illness Maternal Uncle    • Diabetes Maternal Grandmother    • Cancer Maternal Grandmother 79        breast       SOCIAL HISTORY  Social History     Tobacco Use   • Smoking status: Never Smoker  "  • Smokeless tobacco: Never Used   Vaping Use   • Vaping Use: Never used   Substance Use Topics   • Alcohol use: No   • Drug use: No       SURGICAL HISTORY  Past Surgical History:   Procedure Laterality Date   • APPENDECTOMY     • CHOLECYSTECTOMY     • TONSILLECTOMY         CURRENT MEDICATIONS  I personally reviewed the medication list in the charting documentation.     ALLERGIES  Allergies   Allergen Reactions   • Other Food Hives, Vomiting and Swelling     Pineapple, coconut     • Codeine Vomiting       MEDICAL RECORD  I have reviewed patient's medical record and pertinent results are listed above.      PHYSICAL EXAM  VITAL SIGNS: /74   Pulse 77   Temp 36.6 °C (97.8 °F) (Oral)   Resp 18   Ht 1.676 m (5' 6\")   Wt 60.3 kg (133 lb)   SpO2 96%   BMI 21.47 kg/m²    Constitutional: Well appearing patient in no acute distress.  Awake and alert, not toxic nor ill in appearance.  HENT: Normocephalic, no obvious evidence of acute trauma.  Eyes: No scleral icterus. Normal conjunctiva   Neck: Comfortable movement without any obvious restriction in the range of motion.  Thorax & Lungs: Normal nonlabored respirations.      Abdomen: The abdomen is mildly gravid.  Upon palpation she has mild tenderness across the pelvis, right greater than left, no rebound or guarding.  Skin: The exposed portions of skin reveal no obvious rash or other abnormalities.  Extremities/Musculoskeletal: No obvious sign of acute trauma. No asymmetric calf tenderness or edema.   Neurologic: Alert & oriented. No focal deficits observed.   Psychiatric: Normal affect appropriate for the clinical situation.    DIAGNOSTIC STUDIES / PROCEDURES    LABS/EKGs  Results for orders placed or performed during the hospital encounter of 01/28/22   CBC WITH DIFFERENTIAL   Result Value Ref Range    WBC 11.2 (H) 4.8 - 10.8 K/uL    RBC 4.07 (L) 4.20 - 5.40 M/uL    Hemoglobin 11.9 (L) 12.0 - 16.0 g/dL    Hematocrit 34.6 (L) 37.0 - 47.0 %    MCV 85.0 81.4 - 97.8 " fL    MCH 29.2 27.0 - 33.0 pg    MCHC 34.4 33.6 - 35.0 g/dL    RDW 40.6 35.9 - 50.0 fL    Platelet Count 229 164 - 446 K/uL    MPV 11.4 9.0 - 12.9 fL    Neutrophils-Polys 72.40 (H) 44.00 - 72.00 %    Lymphocytes 17.20 (L) 22.00 - 41.00 %    Monocytes 6.50 0.00 - 13.40 %    Eosinophils 2.40 0.00 - 6.90 %    Basophils 0.50 0.00 - 1.80 %    Immature Granulocytes 1.00 (H) 0.00 - 0.90 %    Nucleated RBC 0.00 /100 WBC    Neutrophils (Absolute) 8.07 (H) 2.00 - 7.15 K/uL    Lymphs (Absolute) 1.92 1.00 - 4.80 K/uL    Monos (Absolute) 0.72 0.00 - 0.85 K/uL    Eos (Absolute) 0.27 0.00 - 0.51 K/uL    Baso (Absolute) 0.06 0.00 - 0.12 K/uL    Immature Granulocytes (abs) 0.11 0.00 - 0.11 K/uL    NRBC (Absolute) 0.00 K/uL   COMP METABOLIC PANEL   Result Value Ref Range    Sodium 136 135 - 145 mmol/L    Potassium 3.6 3.6 - 5.5 mmol/L    Chloride 104 96 - 112 mmol/L    Co2 19 (L) 20 - 33 mmol/L    Anion Gap 13.0 7.0 - 16.0    Glucose 92 65 - 99 mg/dL    Bun 11 8 - 22 mg/dL    Creatinine 0.50 0.50 - 1.40 mg/dL    Calcium 8.6 8.5 - 10.5 mg/dL    AST(SGOT) 14 12 - 45 U/L    ALT(SGPT) 7 2 - 50 U/L    Alkaline Phosphatase 44 30 - 99 U/L    Total Bilirubin 0.3 0.1 - 1.5 mg/dL    Albumin 3.7 3.2 - 4.9 g/dL    Total Protein 6.4 6.0 - 8.2 g/dL    Globulin 2.7 1.9 - 3.5 g/dL    A-G Ratio 1.4 g/dL   LIPASE   Result Value Ref Range    Lipase 26 11 - 82 U/L   HCG QUANTITATIVE   Result Value Ref Range    Bhcg 6135.0 (H) 0.0 - 5.0 mIU/mL   URINALYSIS,CULTURE IF INDICATED    Specimen: Urine   Result Value Ref Range    Color Yellow     Character Cloudy (A)     Specific Gravity 1.018 <1.035    Ph 6.5 5.0 - 8.0    Glucose Negative Negative mg/dL    Ketones Negative Negative mg/dL    Protein Negative Negative mg/dL    Bilirubin Negative Negative    Urobilinogen, Urine 0.2 Negative    Nitrite Negative Negative    Leukocyte Esterase Trace (A) Negative    Occult Blood Negative Negative    Micro Urine Req Microscopic    ESTIMATED GFR   Result Value Ref  Range    GFR If African American >60 >60 mL/min/1.73 m 2    GFR If Non African American >60 >60 mL/min/1.73 m 2   URINE MICROSCOPIC (W/UA)   Result Value Ref Range    WBC 10-20 (A) /hpf    RBC 0-2 /hpf    Bacteria Negative None /hpf    Epithelial Cells Few /hpf    Hyaline Cast 3-5 (A) /lpf   URINE CULTURE(NEW)    Specimen: Urine   Result Value Ref Range    Significant Indicator NEG     Source UR     Site -     Culture Result -         RADIOLOGY  US-OB LIMITED WITH TRANSVAGINAL (COMBO)   Final Result      Single intrauterine pregnancy of an estimated gestational age of 17 weeks 4 days with an estimated date of delivery of 7/4/2022.      Possible cervical funneling. Cervical length measures 4.9 cm.            COURSE & MEDICAL DECISION MAKING  I have reviewed any medical record information, laboratory studies and radiographic results as noted above.    Encounter Summary: This is a very pleasant 31 y.o. female who unfortunately required evaluation in the emergency department today with pelvic cramping the setting of second trimester pregnancy, 19 weeks, no fluid loss or vaginal bleeding, she has associated tenderness on exam.  She does have a history of complicated pregnancy but the details of this complication are not quite clear, at that time her obstetrician was with [facility].  She appears well on exam otherwise.  Ultrasound will be obtained, urinalysis will be obtained, triage ordered blood work will also be followed up on, she will be reevaluated ------- work-up is essentially unremarkable.  No significant leukocytosis, no bacteriuria.  The ultrasound reveals single intrauterine gestation that more or less correlates with her dates.  She will follow up with her obstetrician.  Discharged home in stable condition      DISPOSITION: Discharged home in stable condition.      FINAL IMPRESSION  1. Pelvic pain affecting pregnancy in second trimester, antepartum           This dictation was created using voice recognition  software. The accuracy of the dictation is limited to the abilities of the software. I expect there may be some errors of grammar and possibly content. The nursing notes were reviewed and certain aspects of this information were incorporated into this note.    Electronically signed by: Keith Payne M.D., 1/28/2022 8:36 PM

## 2022-01-31 LAB
BACTERIA UR CULT: NORMAL
SIGNIFICANT IND 70042: NORMAL
SITE SITE: NORMAL
SOURCE SOURCE: NORMAL

## 2022-05-09 ENCOUNTER — HOSPITAL ENCOUNTER (EMERGENCY)
Facility: MEDICAL CENTER | Age: 32
End: 2022-05-09
Attending: OBSTETRICS & GYNECOLOGY | Admitting: OBSTETRICS & GYNECOLOGY
Payer: OTHER GOVERNMENT

## 2022-05-09 VITALS
TEMPERATURE: 97.9 F | WEIGHT: 165 LBS | HEART RATE: 71 BPM | BODY MASS INDEX: 26.52 KG/M2 | DIASTOLIC BLOOD PRESSURE: 72 MMHG | RESPIRATION RATE: 16 BRPM | SYSTOLIC BLOOD PRESSURE: 118 MMHG | HEIGHT: 66 IN

## 2022-05-09 LAB
APPEARANCE UR: CLEAR
BILIRUB UR QL STRIP.AUTO: NEGATIVE
COLOR UR: YELLOW
ERYTHROCYTE [DISTWIDTH] IN BLOOD BY AUTOMATED COUNT: 37.4 FL (ref 35.9–50)
GLUCOSE UR STRIP.AUTO-MCNC: NEGATIVE MG/DL
GP B STREP DNA SPEC QL NAA+PROBE: NEGATIVE
HCT VFR BLD AUTO: 36 % (ref 37–47)
HGB BLD-MCNC: 11.9 G/DL (ref 12–16)
KETONES UR STRIP.AUTO-MCNC: NEGATIVE MG/DL
LEUKOCYTE ESTERASE UR QL STRIP.AUTO: NEGATIVE
MCH RBC QN AUTO: 28.2 PG (ref 27–33)
MCHC RBC AUTO-ENTMCNC: 33.1 G/DL (ref 33.6–35)
MCV RBC AUTO: 85.3 FL (ref 81.4–97.8)
MICRO URNS: NORMAL
NITRITE UR QL STRIP.AUTO: NEGATIVE
PH UR STRIP.AUTO: 6.5 [PH] (ref 5–8)
PLATELET # BLD AUTO: 250 K/UL (ref 164–446)
PMV BLD AUTO: 11.6 FL (ref 9–12.9)
PROT UR QL STRIP: NEGATIVE MG/DL
RBC # BLD AUTO: 4.22 M/UL (ref 4.2–5.4)
RBC UR QL AUTO: NEGATIVE
SP GR UR STRIP.AUTO: 1.02
UROBILINOGEN UR STRIP.AUTO-MCNC: 0.2 MG/DL
WBC # BLD AUTO: 12.3 K/UL (ref 4.8–10.8)

## 2022-05-09 PROCEDURE — 87081 CULTURE SCREEN ONLY: CPT

## 2022-05-09 PROCEDURE — A9270 NON-COVERED ITEM OR SERVICE: HCPCS | Performed by: STUDENT IN AN ORGANIZED HEALTH CARE EDUCATION/TRAINING PROGRAM

## 2022-05-09 PROCEDURE — 36415 COLL VENOUS BLD VENIPUNCTURE: CPT

## 2022-05-09 PROCEDURE — 87653 STREP B DNA AMP PROBE: CPT | Mod: XU

## 2022-05-09 PROCEDURE — 87150 DNA/RNA AMPLIFIED PROBE: CPT

## 2022-05-09 PROCEDURE — 700105 HCHG RX REV CODE 258: Performed by: STUDENT IN AN ORGANIZED HEALTH CARE EDUCATION/TRAINING PROGRAM

## 2022-05-09 PROCEDURE — 700102 HCHG RX REV CODE 250 W/ 637 OVERRIDE(OP): Performed by: STUDENT IN AN ORGANIZED HEALTH CARE EDUCATION/TRAINING PROGRAM

## 2022-05-09 PROCEDURE — 81003 URINALYSIS AUTO W/O SCOPE: CPT

## 2022-05-09 PROCEDURE — 59025 FETAL NON-STRESS TEST: CPT

## 2022-05-09 PROCEDURE — 99283 EMERGENCY DEPT VISIT LOW MDM: CPT | Mod: 25 | Performed by: STUDENT IN AN ORGANIZED HEALTH CARE EDUCATION/TRAINING PROGRAM

## 2022-05-09 PROCEDURE — 99284 EMERGENCY DEPT VISIT MOD MDM: CPT

## 2022-05-09 PROCEDURE — 59025 FETAL NON-STRESS TEST: CPT | Mod: 26 | Performed by: STUDENT IN AN ORGANIZED HEALTH CARE EDUCATION/TRAINING PROGRAM

## 2022-05-09 PROCEDURE — 85027 COMPLETE CBC AUTOMATED: CPT

## 2022-05-09 RX ORDER — NIFEDIPINE 10 MG/1
10 CAPSULE ORAL ONCE
Status: COMPLETED | OUTPATIENT
Start: 2022-05-09 | End: 2022-05-09

## 2022-05-09 RX ORDER — ACETAMINOPHEN 500 MG
1000 TABLET ORAL ONCE
Status: COMPLETED | OUTPATIENT
Start: 2022-05-09 | End: 2022-05-09

## 2022-05-09 RX ORDER — SODIUM CHLORIDE, SODIUM LACTATE, POTASSIUM CHLORIDE, CALCIUM CHLORIDE 600; 310; 30; 20 MG/100ML; MG/100ML; MG/100ML; MG/100ML
INJECTION, SOLUTION INTRAVENOUS CONTINUOUS
Status: DISCONTINUED | OUTPATIENT
Start: 2022-05-09 | End: 2022-05-09 | Stop reason: HOSPADM

## 2022-05-09 RX ADMIN — NIFEDIPINE 10 MG: 10 CAPSULE ORAL at 14:25

## 2022-05-09 RX ADMIN — SODIUM CHLORIDE, POTASSIUM CHLORIDE, SODIUM LACTATE AND CALCIUM CHLORIDE: 600; 310; 30; 20 INJECTION, SOLUTION INTRAVENOUS at 13:30

## 2022-05-09 RX ADMIN — ACETAMINOPHEN 1000 MG: 500 TABLET ORAL at 16:33

## 2022-05-09 RX ADMIN — SODIUM CHLORIDE, POTASSIUM CHLORIDE, SODIUM LACTATE AND CALCIUM CHLORIDE: 600; 310; 30; 20 INJECTION, SOLUTION INTRAVENOUS at 13:31

## 2022-05-09 ASSESSMENT — FIBROSIS 4 INDEX: FIB4 SCORE: 0.7
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
## 2022-05-10 LAB — GP B STREP DNA SPEC QL NAA+PROBE: NEGATIVE

## 2022-05-10 NOTE — PROGRESS NOTES
1700 Report received from Otilia NESBITT.   1720 At bedside with Shreyas CAN, by Dr. Pritchett. FT/Thick/High. Patient feels increased space and occasional and mild UCs, FMT supports patient report. Dr. Pritchett reviewed strip. Orders received to discontinue EFM and TOCO. Awaiting urinalysis results for discharge.   1815 Urinalysis was back. No indication for infection. Dr. Pritchett given report. Orders received to discharge patient.   1819 Discharge Instructions given to patient. Patient discharged in stable condition with family.   1822 Patient walked to car with family.

## 2022-06-12 ENCOUNTER — HOSPITAL ENCOUNTER (EMERGENCY)
Facility: MEDICAL CENTER | Age: 32
End: 2022-06-13
Attending: OBSTETRICS & GYNECOLOGY | Admitting: OBSTETRICS & GYNECOLOGY
Payer: OTHER GOVERNMENT

## 2022-06-12 VITALS
SYSTOLIC BLOOD PRESSURE: 138 MMHG | TEMPERATURE: 96.6 F | DIASTOLIC BLOOD PRESSURE: 80 MMHG | BODY MASS INDEX: 26.52 KG/M2 | HEART RATE: 83 BPM | HEIGHT: 66 IN | WEIGHT: 165 LBS | RESPIRATION RATE: 18 BRPM

## 2022-06-12 PROCEDURE — 85460 HEMOGLOBIN FETAL: CPT

## 2022-06-12 PROCEDURE — 700102 HCHG RX REV CODE 250 W/ 637 OVERRIDE(OP): Performed by: NURSE PRACTITIONER

## 2022-06-12 PROCEDURE — 86901 BLOOD TYPING SEROLOGIC RH(D): CPT

## 2022-06-12 PROCEDURE — 99284 EMERGENCY DEPT VISIT MOD MDM: CPT

## 2022-06-12 PROCEDURE — 700111 HCHG RX REV CODE 636 W/ 250 OVERRIDE (IP): Performed by: NURSE PRACTITIONER

## 2022-06-12 PROCEDURE — A9270 NON-COVERED ITEM OR SERVICE: HCPCS | Performed by: NURSE PRACTITIONER

## 2022-06-12 PROCEDURE — 36415 COLL VENOUS BLD VENIPUNCTURE: CPT

## 2022-06-12 RX ORDER — ACETAMINOPHEN 325 MG/1
650 TABLET ORAL ONCE
Status: COMPLETED | OUTPATIENT
Start: 2022-06-12 | End: 2022-06-12

## 2022-06-12 RX ORDER — ONDANSETRON 4 MG/1
4 TABLET, ORALLY DISINTEGRATING ORAL ONCE
Status: COMPLETED | OUTPATIENT
Start: 2022-06-13 | End: 2022-06-12

## 2022-06-12 RX ADMIN — ACETAMINOPHEN 650 MG: 325 TABLET, FILM COATED ORAL at 23:39

## 2022-06-12 RX ADMIN — ONDANSETRON 4 MG: 4 TABLET, ORALLY DISINTEGRATING ORAL at 23:40

## 2022-06-12 ASSESSMENT — FIBROSIS 4 INDEX: FIB4 SCORE: 0.72

## 2022-06-13 LAB
ACTION RH IMMUNE GLOB 8505RHG: NORMAL
ADULT RBCS COUNTED 8505ARB2: 4950 ADULT RBC
FETAL RBC PERCENT 8505FRBP: 0 %
FETAL STAIN FRBC 8505FRBC: 0 FETAL RBC
NUMBER OF RH DOSES IND 8505RD: 2
NUMBER OF RH DOSES IND 8505RD: 2 # RHIG
WEAK D AG RBC QL: NORMAL

## 2022-06-13 PROCEDURE — 59025 FETAL NON-STRESS TEST: CPT

## 2022-06-13 PROCEDURE — 59025 FETAL NON-STRESS TEST: CPT | Mod: 26 | Performed by: NURSE PRACTITIONER

## 2022-06-13 PROCEDURE — 96374 THER/PROPH/DIAG INJ IV PUSH: CPT

## 2022-06-13 PROCEDURE — 99281 EMR DPT VST MAYX REQ PHY/QHP: CPT | Mod: 25 | Performed by: NURSE PRACTITIONER
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
